# Patient Record
Sex: MALE | Race: WHITE | Employment: FULL TIME | URBAN - METROPOLITAN AREA
[De-identification: names, ages, dates, MRNs, and addresses within clinical notes are randomized per-mention and may not be internally consistent; named-entity substitution may affect disease eponyms.]

---

## 2018-02-06 ENCOUNTER — HOSPITAL ENCOUNTER (EMERGENCY)
Age: 53
Discharge: HOME | End: 2018-02-06
Attending: EMERGENCY MEDICINE
Payer: COMMERCIAL

## 2018-02-06 ENCOUNTER — APPOINTMENT (OUTPATIENT)
Dept: GENERAL RADIOLOGY | Age: 53
End: 2018-02-06
Attending: EMERGENCY MEDICINE
Payer: COMMERCIAL

## 2018-02-06 VITALS
BODY MASS INDEX: 35.94 KG/M2 | TEMPERATURE: 98.4 F | SYSTOLIC BLOOD PRESSURE: 123 MMHG | HEART RATE: 70 BPM | DIASTOLIC BLOOD PRESSURE: 72 MMHG | WEIGHT: 280 LBS | HEIGHT: 74 IN | RESPIRATION RATE: 16 BRPM | OXYGEN SATURATION: 100 %

## 2018-02-06 DIAGNOSIS — M54.9 ACUTE BACK PAIN, UNSPECIFIED BACK LOCATION, UNSPECIFIED BACK PAIN LATERALITY: ICD-10-CM

## 2018-02-06 DIAGNOSIS — M25.559 ARTHRALGIA OF HIP, UNSPECIFIED LATERALITY: ICD-10-CM

## 2018-02-06 DIAGNOSIS — I10 HYPERTENSION, UNSPECIFIED TYPE: Primary | ICD-10-CM

## 2018-02-06 PROCEDURE — 73502 X-RAY EXAM HIP UNI 2-3 VIEWS: CPT

## 2018-02-06 PROCEDURE — 74011250637 HC RX REV CODE- 250/637: Performed by: EMERGENCY MEDICINE

## 2018-02-06 PROCEDURE — 99283 EMERGENCY DEPT VISIT LOW MDM: CPT

## 2018-02-06 PROCEDURE — 72110 X-RAY EXAM L-2 SPINE 4/>VWS: CPT

## 2018-02-06 RX ORDER — AMLODIPINE BESYLATE 5 MG/1
5 TABLET ORAL DAILY
COMMUNITY

## 2018-02-06 RX ORDER — HYDROCODONE BITARTRATE AND ACETAMINOPHEN 5; 325 MG/1; MG/1
1 TABLET ORAL
Status: COMPLETED | OUTPATIENT
Start: 2018-02-06 | End: 2018-02-06

## 2018-02-06 RX ORDER — DOCUSATE SODIUM 100 MG/1
100 CAPSULE, LIQUID FILLED ORAL 2 TIMES DAILY
Qty: 60 CAP | Refills: 2 | Status: SHIPPED | OUTPATIENT
Start: 2018-02-06 | End: 2018-05-07

## 2018-02-06 RX ORDER — DIAZEPAM 5 MG/1
5 TABLET ORAL
Status: COMPLETED | OUTPATIENT
Start: 2018-02-06 | End: 2018-02-06

## 2018-02-06 RX ORDER — DIAZEPAM 5 MG/1
2.5 TABLET ORAL
Qty: 12 TAB | Refills: 0 | Status: SHIPPED | OUTPATIENT
Start: 2018-02-06

## 2018-02-06 RX ORDER — HYDROCODONE BITARTRATE AND ACETAMINOPHEN 5; 325 MG/1; MG/1
1 TABLET ORAL
Qty: 20 TAB | Refills: 0 | Status: SHIPPED | OUTPATIENT
Start: 2018-02-06

## 2018-02-06 RX ORDER — PREDNISONE 20 MG/1
60 TABLET ORAL DAILY
Qty: 15 TAB | Refills: 0 | Status: SHIPPED | OUTPATIENT
Start: 2018-02-06 | End: 2018-02-11

## 2018-02-06 RX ADMIN — HYDROCODONE BITARTRATE AND ACETAMINOPHEN 1 TABLET: 5; 325 TABLET ORAL at 10:18

## 2018-02-06 RX ADMIN — DIAZEPAM 5 MG: 5 TABLET ORAL at 10:18

## 2018-02-06 NOTE — DISCHARGE INSTRUCTIONS
Please see your doctor or clinic in 24-48 hours. Please return for increased pain, loss of bowel or bladder control or any other concerns.      If your symptoms continue, please talk to your doctor about further testing such as a MRI    Your blood pressure was high today, please be sure to take your medications and have your doctor recheck it

## 2018-02-06 NOTE — ED TRIAGE NOTES
Foot got caught while exiting car on Sunday. Fell to ground, landing on hip. C/o right low back/hip pain that \"feels like it is out of socket\".

## 2018-02-06 NOTE — ED PROVIDER NOTES
HPI Comments: Pt with fall about 3 days ago- states that he was getting out of the drivers side of the car and his rt foot was stuck in the door- this caused him to straddle/hyperextend his rt leg    Since then he has had pain in the rt lateral leg- the sensation that his hip joint is \"out of place\"    He states that he is able to walk, but at the end of the day his pain is severe and excruciating    He has been taking ibuprofen and occasional flexeril with minimal improvement    He has pain in the rt groin, worse with movement and lifting his leg is painful, he has to use his hand to initiate the movement    Patient is a 52 y.o. male presenting with hip pain. The history is provided by the patient and the spouse. Hip Injury    This is a recurrent problem. The current episode started more than 2 days ago. The problem has not changed since onset. The pain is present in the right hip. The quality of the pain is described as aching. The pain is moderate. Associated symptoms include limited range of motion. Pertinent negatives include no numbness, no tingling, no back pain and no neck pain. The symptoms are aggravated by standing. Past Medical History:   Diagnosis Date    Lymphoblastic leukemia, chronic (HCC)     Melanoma (HCC)     Tenosynovitis of thumb        Past Surgical History:   Procedure Laterality Date    HX LUMBAR LAMINECTOMY      x 2         Family History:   Problem Relation Age of Onset    Diabetes Maternal Grandmother     Hypertension Brother    Parth Coleamn Arthritis-osteo Mother        Social History     Social History    Marital status:      Spouse name: N/A    Number of children: N/A    Years of education: N/A     Occupational History    Not on file.      Social History Main Topics    Smoking status: Never Smoker    Smokeless tobacco: Not on file    Alcohol use Yes      Comment: once a week    Drug use: Not on file    Sexual activity: Not on file     Other Topics Concern    Not on file     Social History Narrative         ALLERGIES: Review of patient's allergies indicates no known allergies. Review of Systems   Constitutional:        Pt denies other acute medical complaints or concerns. Gastrointestinal: Negative for constipation. Genitourinary: Negative for dysuria and frequency. Musculoskeletal: Negative for back pain and neck pain. Neurological: Negative for tingling and numbness. Vitals:    02/06/18 0945   BP: (!) 155/102   Pulse: 93   Resp: 19   Temp: 98.4 °F (36.9 °C)   SpO2: 98%   Weight: 127 kg (280 lb)   Height: 6' 2\" (1.88 m)            Physical Exam   Constitutional: He is oriented to person, place, and time. He appears well-developed and well-nourished. HENT:   Head: Normocephalic and atraumatic. Eyes: Conjunctivae are normal.   Cardiovascular: Normal rate and regular rhythm. Pulmonary/Chest: Effort normal and breath sounds normal.   Musculoskeletal: He exhibits tenderness. He exhibits no edema or deformity. Right hip: He exhibits tenderness. He exhibits normal range of motion, normal strength, no bony tenderness and no swelling. Legs:  Neurological: He is alert and oriented to person, place, and time. Skin: Skin is warm. Psychiatric: He has a normal mood and affect. Vitals reviewed. MDM  Number of Diagnoses or Management Options  Diagnosis management comments: No mid mine TTP, no loss of bowel or bladder control    Reviewed with pt need for follow up, sx control and follow up with his spine specialist        ED Course   Comment By Time   Pt ambulatory in the ED, no clear weakness    Reviewed with pt and family that this may be a back process, that he will need follow up and further imaging, such as an MRI.     At this time with no loss of bowel or bladder function and good motor function Mayra Greer MD 02/06 1026   Pt spouse is very upset, feels as though pt needs emergent MRI, attempted to calm and explain to her that given pt's physical exam fist steps would be sx control and close follow up, she is calling her husbands doctor to discuss Fawn Negrete MD 02/06 1028   Pt feeling much better, comfortable laying in bed- awaiting xray results Fawn Negrete MD 02/06 1119   Lumbar spine - 5 view(s)     History: Lower back pain radiating down right hip and leg after fall on Sunday     Comparison: None     Findings:     Mineralization is normal. 5 nonrib-bearing lumbar appearing vertebral bodies. The vertebra are normal in height. The disc spaces are preserved other than for  L5-S1 where it is moderately narrowed with endplate sclerosis and marginal  osteophytes. There is 4 mm retrolisthesis L3 on L4 and 3 mm retrolisthesis L4 on  L5. The lumbar lordosis is otherwise preserved. The pedicles and pars are  intact. Facets are aligned and hypertrophic L3-S1.      IMPRESSION  Impression:     No acute fracture. Grade 1 retrolisthesis L3 on L4 and L4 on L5 associated with moderate facet  hypertrophy. Moderate DDD L5-S1 Fawn Negrete MD 02/06 9759   Right hip - 2 view(s)     History: Lower back and right hip and leg pain after a fall a few days ago.     Comparison: None.     Findings:      AP pelvis and frog view of the right hip obtained. Mineralization is normal. The  bony pelvic ring is intact. The right hip joint space is unremarkable.  There is  no evidence of fracture or dislocation.       IMPRESSION  Impression:     Negative hip.     Thank you for this referral. Fawn Negrete MD 02/06 9593       Procedures

## 2018-02-06 NOTE — ED NOTES
Veronica Duarte is a 52 y.o. male that was discharged in stable. Pt was accompanied by spouse. Pt is not driving. The patients diagnosis, condition and treatment were explained to  patient and aftercare instructions were given. The patient verbalized understanding. Patient armband removed and shredded.

## 2018-02-07 ENCOUNTER — OFFICE VISIT (OUTPATIENT)
Dept: ORTHOPEDIC SURGERY | Age: 53
End: 2018-02-07

## 2018-02-07 VITALS
HEIGHT: 74 IN | OXYGEN SATURATION: 98 % | DIASTOLIC BLOOD PRESSURE: 105 MMHG | HEART RATE: 88 BPM | SYSTOLIC BLOOD PRESSURE: 150 MMHG | WEIGHT: 285 LBS | RESPIRATION RATE: 18 BRPM | BODY MASS INDEX: 36.57 KG/M2

## 2018-02-07 DIAGNOSIS — M54.16 LUMBAR RADICULOPATHY: Primary | ICD-10-CM

## 2018-02-07 NOTE — PROGRESS NOTES
[address], [address]  [place]  Phone: [phone]  Fax: [phone]        Naun Carcamo  : 1965  PCP: Jamison Keller MD  2018    NEW PATIENT      ASSESSMENT AND PLAN     Mercedes Lott comes in to the office today c/o acute low back pain that radiates into his right leg in an L5 distribution. Pt's acute pain and physical exam are strongly suggestive of an L5 radiculopathy with neurological deficits. There may be an element of chemical radiculopathy given the acuity/severity and pollard response to steroid treatment. I will refer to PT for lumbar radiculopathy. Given his prior surgeries and new onset symptoms, will try to obtain MRI to evaluate his sensory deficit and weakness in R leg. Advised to continue with steroid pack prescribed by the ER. Pt will f/u in 2 weeks or sooner if needed. Diagnoses and all orders for this visit:    1. Lumbar radiculopathy       Follow-up Disposition: Not on File    [facility]IE[facility] [address] [facility]. is seen today in consultation at the request of Jamison Keller MD for complaints of low back pain. HISTORY OF PRESENT ILLNESS  Mercedes Lott is a 52 y.o. male c/o lower back pain that started . He went to [facility] ER due to the pain where he was treated with a steroid pack that has provided relief. He feels as if \"his leg is not sitting in his hip joint correctly. \" He is having \"spasms\" in his left leg. He has a history of laminectomies on L4-5. Wife states she has been helping him with daily activities such as putting on shoes. He states the pain is similar to the pain he had prior to his laminectomies. Pt denies any fevers, chills, nausea, vomiting. Pt denies any chest pain and shortness of breath. Pt denies any ear, nose, and throat problems. Pt denies any fecal or urinary incontinence.        PAST MEDICAL HISTORY   Past Medical History: Diagnosis Date    Lymphoblastic leukemia, chronic (HCC)     Melanoma (HCC)     Tenosynovitis of thumb        Past Surgical History:   Procedure Laterality Date    HX LUMBAR LAMINECTOMY      x 2       MEDICATIONS    Current Outpatient Prescriptions   Medication Sig Dispense Refill    amLODIPine (NORVASC) 5 mg tablet Take 5 mg by mouth daily.  HYDROcodone-acetaminophen (NORCO) 5-325 mg per tablet Take 1 Tab by mouth every four (4) hours as needed for Pain. Max Daily Amount: 6 Tabs. 20 Tab 0    docusate sodium (COLACE) 100 mg capsule Take 1 Cap by mouth two (2) times a day for 90 days. 60 Cap 2    diazePAM (VALIUM) 5 mg tablet Take 0.5 Tabs by mouth every eight (8) hours as needed (spasm) for up to 12 doses. Max Daily Amount: 7.5 mg. Indications: Muscle Spasm 12 Tab 0    predniSONE (DELTASONE) 20 mg tablet Take 3 Tabs by mouth daily for 5 days. With Breakfast 15 Tab 0       ALLERGIES  No Known Allergies       SOCIAL HISTORY    Social History     Social History    Marital status:      Spouse name: N/A    Number of children: N/A    Years of education: N/A     Social History Main Topics    Smoking status: Never Smoker    Smokeless tobacco: Not on file    Alcohol use Yes      Comment: once a week    Drug use: Not on file    Sexual activity: Not on file     Other Topics Concern    Not on file     Social History Narrative       FAMILY HISTORY  Family History   Problem Relation Age of Onset    Diabetes Maternal Grandmother     Hypertension Brother     Arthritis-osteo Mother          REVIEW OF SYSTEMS  Review of Systems   Musculoskeletal: Positive for back pain. PHYSICAL EXAMINATION  Visit Vitals    BP (!) 150/105    Pulse 88    Resp 18    Ht 6' 2\" (1.88 m)    Wt 285 lb (129.3 kg)    SpO2 98%    BMI 36.59 kg/m2         Pain Assessment  1/20/2015   Location of Pain Ankle; Foot   Location Modifiers Left   Severity of Pain 8   Quality of Pain Aching; Throbbing   Frequency of Pain Constant   Aggravating Factors Standing;Walking   Limiting Behavior Yes   Relieving Factors Rest   Result of Injury No         Constitutional:  Well developed, well nourished, in no acute distress. Psychiatric: Affect and mood are appropriate. HEENT: Normocephalic, atraumatic. Extraocular movements intact. Integumentary: No rashes or abrasions noted on exposed areas. Cardiovascular: Regular rate and rhythm. Pulmonary: Clear to auscultation bilaterally. SPINE/MUSCULOSKELETAL EXAM    Cervical spine:  Neck is midline. Normal muscle tone. No focal atrophy is noted. ROM pain free. Shoulder ROM intact. No tenderness to palpation. Negative Spurling's sign. Negative Tinel's sign. Negative Adamson's sign. Sensation in the bilateral arms grossly intact to light touch. Lumbar spine:  No rash, ecchymosis, or gross obliquity. No fasciculations. No focal atrophy is noted. No pain with hip ROM. Full range of motion. Tenderness to palpation on right lumbar paraspinals. No tenderness to palpation at the sciatic notch. SI joints non-tender. Trochanters non tender. Decreased sensation in R L5. MOTOR:      Biceps  Triceps Deltoids Wrist Ext Wrist Flex Hand Intrin   Right 5/5 5/5 5/5 5/5 5/5 5/5   Left 5/5 5/5 5/5 5/5 5/5 5/5             Hip Flex  Quads Hamstrings Ankle DF EHL Ankle PF   Right 5/5 5/5 5/5 4/5 4/5 5/5   Left 5/5 5/5 5/5 5/5 5/5 5/5     DTRs are +2 biceps, triceps, brachioradialis, patella, and Left Achilles. Decreased reflexes on right Achilles. Positive Right Straight Leg raise. Squat not tested. No difficulty with tandem gait. Ambulation without assistive device. FWB. RADIOGRAPHS  4 View Lumbar spine images taken on 2/6/18 personally reviewed with patient:  No acute fracture. Grade 1 retrolisthesis L3 on L4 and L4 on L5 associated with moderate facet  hypertrophy.   Moderate DDD L5-S1.    2 View Lumbar spine with extension and flexion images taken today in office personally reviewed with patient:  Disk space narrowing between L5-S1. Mild facet sclerosis. No compression fracture. Osteophyte at L5. No listhesis.  reviewed    Mr. Irma Enamorado has a reminder for a \"due or due soon\" health maintenance. I have asked that he contact his primary care provider for follow-up on this health maintenance. This plan was reviewed with the patient and patient agrees. All questions were answered. More than half of this visit today was spent on counseling. Written by Syed Parsons, as dictated by Dr. Donald Martin. I, Dr. Donald Martin, confirm that all documentation is accurate.

## 2018-02-07 NOTE — MR AVS SNAPSHOT
[address] [place] [address] [address] [facility] [address] [address] 
[phone] Patient: Hayley Bermudez MRN: [id number] :1965 Visit Information Date & Time Provider Department Dept. Phone Encounter #  
 2018 10:15 AM Suad Medrano MD [facility] [phone] [id number] Follow-up Instructions Return in about 2 weeks (around 2018). Upcoming Health Maintenance Date Due Hepatitis C Screening 1965 DTaP/Tdap/Td series (1 - Tdap) 1984 FOBT Q 1 YEAR AGE 50-75 2013 Influenza Age 5 to Adult 2017 Allergies as of 2018  Review Complete On: 2018 By: Dali Casillas No Known Allergies Current Immunizations  Never Reviewed No immunizations on file. Not reviewed this visit You Were Diagnosed With   
  
 Codes Comments Lumbar radiculopathy    -  Primary ICD-10-CM: M54.16 
ICD-9-CM: 724.4 Vitals BP Pulse Resp Height(growth percentile) Weight(growth percentile) SpO2  
 (!) 150/105 88 18 6' 2\" (1.88 m) 285 lb (129.3 kg) 98% BMI Smoking Status 36.59 kg/m2 Never Smoker BMI and BSA Data Body Mass Index Body Surface Area  
 36.59 kg/m 2 2.6 m 2 Preferred Pharmacy Pharmacy Name Phone [facility] [address] [place] [address] [phone] Your Updated Medication List  
  
   
This list is accurate as of: 18 11:50 AM.  Always use your most recent med list.  
  
  
  
  
 diazePAM 5 mg tablet Commonly known as:  VALIUM Take 0.5 Tabs by mouth every eight (8) hours as needed (spasm) for up to 12 doses. Max Daily Amount: 7.5 mg. Indications: Muscle Spasm  
  
 docusate sodium 100 mg capsule Commonly known as:  Montey Roch Take 1 Cap by mouth two (2) times a day for 90 days. HYDROcodone-acetaminophen 5-325 mg per tablet Commonly known as:  Julia Simpson  
 Take 1 Tab by mouth every four (4) hours as needed for Pain. Max Daily Amount: 6 Tabs. NORVASC 5 mg tablet Generic drug:  amLODIPine Take 5 mg by mouth daily. predniSONE 20 mg tablet Commonly known as:  Verlytito Nick Take 3 Tabs by mouth daily for 5 days. With Breakfast  
  
  
  
  
We Performed the Following AMB POC XRAY, SPINE, LUMBOSACRAL; 2 O [91424 CPT(R)] REFERRAL TO PHYSICAL THERAPY [ODS71 Custom] Comments:  
 eval and treat for Lumbar radiculopathy Include Froy method Peg Lever at [facility] Follow-up Instructions Return in about 2 weeks (around 2/21/2018). To-Do List   
 02/14/2018 Imaging:  MRI LUMB SPINE WO CONT   
  
 02/15/2018 7:15 AM  
  Appointment with [facility] RM 2 at [address] [place] [address] ([phone]) GENERAL INSTRUCTIONS  Bring information (ID card) if you have any medically implanted devices. You will be required to lie still while the procedure is being performed. Remove any jewelry (including body piercing, hairpins) prior to MRI. If you have had a creatinine level drawn within the past 30 days, please bring most recent results to your appt. Bring any films, CD's, and reports related to your study with you on the day of your exam.  This only includes studies done outside of [facility], [facility], [facility], and [facility]. Bring a complete list of all medications you are currently taking to include prescriptions, over-the-counter meds, herbals, vitamins & any dietary supplements. If you were given medications for claustrophobia or anxiety, please arrange to have someone drive you to your appointment. QUESTIONS  Notify the MRI Department if you have any questions concerning your study. Jelena Castro [address] [phone] [address] - [phone] Referral Information Referral ID Referred By Referred To  
  
 [id number] [facility] [address] [place], [place] [address] Phone: [phone] Fax: [phone] Visits Status Start Date End Date 1 New Request 2/7/18 2/7/19 If your referral has a status of pending review or denied, additional information will be sent to support the outcome of this decision. Referral ID Referred By Referred To  
 [id number] DESI SPENCE Not Available Visits Status Start Date End Date 1 New Request 2/7/18 2/7/19 If your referral has a status of pending review or denied, additional information will be sent to support the outcome of this decision. Introducing \[facility]\"" & HEALTH SERVICES! Dear Anthony Dyer: Thank you for requesting a [employer] account. Our records indicate that you already have an active [employer] account. You can access your account anytime at [email or web address] Where Was it [email or web address] Did you know that you can access your hospital and ER discharge instructions at any time in [employer]? You can also review all of your test results from your hospital stay or ER visit. Additional Information If you have questions, please visit the Frequently Asked Questions section of the [employer] website at [email or web address] Remember, [employer] is NOT to be used for urgent needs. For medical emergencies, dial 911. Now available from your iPhone and Android! Please provide this summary of care documentation to your next provider. Your primary care clinician is listed as [facility]. If you have any questions after today's visit, please call [phone].

## 2018-02-15 ENCOUNTER — HOSPITAL ENCOUNTER (OUTPATIENT)
Age: 53
Discharge: HOME | End: 2018-02-15
Attending: PHYSICAL MEDICINE & REHABILITATION
Payer: COMMERCIAL

## 2018-02-15 DIAGNOSIS — M54.16 LUMBAR RADICULOPATHY: ICD-10-CM

## 2018-02-15 PROCEDURE — 72148 MRI LUMBAR SPINE W/O DYE: CPT

## 2018-03-07 ENCOUNTER — OFFICE VISIT (OUTPATIENT)
Dept: ORTHOPEDIC SURGERY | Age: 53
End: 2018-03-07

## 2018-03-07 VITALS
SYSTOLIC BLOOD PRESSURE: 139 MMHG | DIASTOLIC BLOOD PRESSURE: 88 MMHG | WEIGHT: 287 LBS | TEMPERATURE: 97.6 F | RESPIRATION RATE: 17 BRPM | HEIGHT: 74 IN | HEART RATE: 72 BPM | BODY MASS INDEX: 36.83 KG/M2 | OXYGEN SATURATION: 96 %

## 2018-03-07 DIAGNOSIS — M54.16 LUMBAR RADICULOPATHY: Primary | ICD-10-CM

## 2018-03-07 NOTE — PROGRESS NOTES
[facility]  [address], [address]  [place], [address] [place] [address] Street  Phone: [phone]  Fax: [phone]        Giancarlo Zamora  : 1965  PCP: Citlaly Valero MD  3/7/2018    PROGRESS NOTE      ASSESSMENT AND PLAN    Damon Meckel. comes in to the office today for MRI f/u. Overall, he has seen an improvement in his pain and rates it as a 1/10 today. However, he continues to have some low back pain that radiates into his right leg in an L5 distribution. His MRI reveals L5-S1 degenerative changes resulting in moderate bilateral foraminal stenoses at L5. These findings seemingly correlate with his RLE radicular symptoms. There is also a HIZ at L4-5 which may be responsible for a local neuritis. At this time, he will monitor his symptoms and begin PT tomorrow. His MRI reveals some renal cysts. He will follow up with his PCP. I advised he maintain an HEP. Pt will f/u in 3 months or sooner as needed. Diagnoses and all orders for this visit:    1. Lumbar radiculopathy       Follow-up Disposition: Not on File      HISTORY OF PRESENT ILLNESS  Damon Meckel. is a 52 y.o. male. A&P / HPI from 18:  Damon Meckel. comes in to the office today c/o acute low back pain that radiates into his right leg in an L5 distribution.     Pt's acute pain and physical exam are strongly suggestive of an L5 radiculopathy with neurological deficits. There may be an element of chemical radiculopathy given the acuity/severity and pollard response to steroid treatment.      I will refer to PT for lumbar radiculopathy. Given his prior surgeries and new onset symptoms, will try to obtain MRI to evaluate his sensory deficit and weakness in R leg.      Advised to continue with steroid pack prescribed by the ER.     Pt will f/u in 2 weeks or sooner if needed. HISTORY OF PRESENT ILLNESS  Damon Meckel. is a 52 y.o. male c/o lower back pain that started .  He went to [place] [address] ER due to the pain where he was treated with a steroid pack that has provided relief. He feels as if \"his leg is not sitting in his hip joint correctly. \" He is having \"spasms\" in his left leg. He has a history of laminectomies on L4-5. Wife states she has been helping him with daily activities such as putting on shoes. He states the pain is similar to the pain he had prior to his laminectomies.      Pt denies any fevers, chills, nausea, vomiting. Pt denies any chest pain and shortness of breath. Pt denies any ear, nose, and throat problems. Pt denies any fecal or urinary incontinence. Updates from 03/07/18:  Pt presents for MRI f/u. Overall, he has seen an improvement in his pain and rates it as a 1/10 today. However, he continues to have some low back pain that radiates into his right leg in an L5 distribution. He begins PT tomorrow. He is accompanied by his wife. PAST MEDICAL HISTORY   Past Medical History:   Diagnosis Date    [facility] [address]     Melanoma ([facility] [address]     Tenosynovitis of thumb        Past Surgical History:   Procedure Laterality Date    HX LUMBAR LAMINECTOMY      x 2   . MEDICATIONS    Current Outpatient Prescriptions   Medication Sig Dispense Refill    amLODIPine (NORVASC) 5 mg tablet Take 5 mg by mouth daily.  HYDROcodone-acetaminophen (NORCO) 5-325 mg per tablet Take 1 Tab by mouth every four (4) hours as needed for Pain. Max Daily Amount: 6 Tabs. 20 Tab 0    docusate sodium (COLACE) 100 mg capsule Take 1 Cap by mouth two (2) times a day for 90 days. 60 Cap 2    diazePAM (VALIUM) 5 mg tablet Take 0.5 Tabs by mouth every eight (8) hours as needed (spasm) for up to 12 doses. Max Daily Amount: 7.5 mg.  Indications: Muscle Spasm 12 Tab 0        ALLERGIES  No Known Allergies       SOCIAL HISTORY    Social History     Social History    Marital status:      Spouse name: N/A    Number of children: N/A    Years of education: N/A     Social History Main Topics    Smoking status: Never Smoker    Smokeless tobacco: Not on file    Alcohol use Yes      Comment: once a week    Drug use: Not on file    Sexual activity: Not on file     Other Topics Concern    Not on file     Social History Narrative       FAMILY HISTORY  Family History   Problem Relation Age of Onset    Diabetes Maternal Grandmother     Hypertension Brother     Arthritis-osteo Mother          REVIEW OF SYSTEMS  Review of Systems   Musculoskeletal: Positive for back pain. PHYSICAL EXAMINATION  There were no vitals taken for this visit. Pain Assessment  1/20/2015   Location of Pain Ankle; Foot   Location Modifiers Left   Severity of Pain 8   Quality of Pain Aching; Throbbing   Frequency of Pain Constant   Aggravating Factors Standing;Walking   Limiting Behavior Yes   Relieving Factors Rest   Result of Injury No           Constitutional:  Well developed, well nourished, in no acute distress. Psychiatric: Affect and mood are appropriate. Integumentary: No rashes or abrasions noted on exposed areas. SPINE/MUSCULOSKELETAL EXAM    Cervical spine:  Neck is midline. Normal muscle tone. No focal atrophy is noted. ROM pain free. Shoulder ROM intact. No tenderness to palpation. Negative Spurling's sign. Negative Tinel's sign. Negative Adamson's sign.       Sensation in the bilateral arms grossly intact to light touch.      Lumbar spine:  No rash, ecchymosis, or gross obliquity. No fasciculations. No focal atrophy is noted. No pain with hip ROM. Full range of motion. Tenderness to palpation on right lumbar paraspinals. No tenderness to palpation at the sciatic notch. SI joints non-tender. Trochanters non tender.      Decreased sensation in R L5.     MOTOR:      Biceps  Triceps Deltoids Wrist Ext Wrist Flex Hand Intrin   Right 5/5 5/5 5/5 5/5 5/5 5/5   Left 5/5 5/5 5/5 5/5 5/5 5/5             Hip Flex  Quads Hamstrings Ankle DF EHL Ankle PF   Right 5/5 5/5 5/5 5/5 5/5 5/5   Left 5/5 5/5 5/5 5/5 5/5 5/5     RADIOGRAPHS  Lumbar MRI images taken on 2/15/18 personally reviewed with patient:  FINDINGS:     General: Vertebral body heights preserved. L5-S1 mild disc space loss with  chronic-appearing endplate changes. L4-L5 disc desiccation. No significant  listhesis. Lumbar lordosis maintained. Conus ends at level L1. No abnormal  signal in the distal cord. No abnormal epidural collection identified. No  suspicious marrow signal.     Miscellaneous: Probable right renal cyst and probable subcentimeter left renal  cyst.     Levels:     T11-T12, T12-L1: Evaluated sagittal plane only. No significant degenerative  changes or stenosis.     L1-L2: No significant degenerative changes or stenosis.     L2-L3: No significant degenerative changes or stenosis.     L3-L4: Minimal disc bulge with small left foraminal disc protrusion. Facets  unremarkable. Spinal canal patent. Minimal foraminal narrowing.     L4-L5: Mild disc bulge. Mild facet arthropathy. Minimal spinal canal narrowing. Mild bilateral foraminal stenoses.     L5-S1: Mild disc space loss with mild to moderate disc bulge and right foraminal  to lateral disc/osteophyte protrusion. Mild facet arthropathy. Spinal canal  patent. Moderate bilateral foraminal stenoses with probable abutment of the  exiting L5 roots.     Imaged sacrum: Unremarkable.     IMPRESSION  IMPRESSION:     1. L5-S1 degenerative changes characterized primarily by disc disease results in  moderate foraminal stenoses and possible abutment of the exiting L5 roots.     -L4-L5 degenerative changes results in minimal spinal canal narrowing and mild  foraminal stenoses.     -Lesser degree degenerative changes and stenoses elsewhere. See level by level  details above. 4 View Lumbar spine images taken on 2/6/18 personally reviewed with patient:  No acute fracture.   Grade 1 retrolisthesis L3 on L4 and L4 on L5 associated with moderate facet  hypertrophy. Moderate DDD L5-S1.     2 View Lumbar spine with extension and flexion images taken today in office personally reviewed with patient:  Disk space narrowing between L5-S1. Mild facet sclerosis. No compression fracture. Osteophyte at L5. No listhesis. 25 minutes of face-to-face contact were spent with the patient during today's visit extensively discussing symptoms and treatment plan. All questions were answered. More than half of this visit today was spent on counseling.      Written by Radha Smith as dictated by Compa Ogden MD

## 2018-03-07 NOTE — MR AVS SNAPSHOT
[address] [place] [address] [address] [facility] [address] 
[phone] Patient: Matthew Balbuena. MRN: [id number] :1965 Visit Information Date & Time Provider Department Dept. Phone Encounter #  
 3/7/2018  3:30 PM Anjum Mcdonald MD [facility] [phone] [id number] Follow-up Instructions Return in about 3 months (around 2018). Upcoming Health Maintenance Date Due Hepatitis C Screening 1965 DTaP/Tdap/Td series (1 - Tdap) 1984 FOBT Q 1 YEAR AGE 50-75 2013 Influenza Age 5 to Adult 2017 Allergies as of 3/7/2018  Review Complete On: 2018 By: Anjum Mcdonald MD  
 No Known Allergies Current Immunizations  Never Reviewed No immunizations on file. Not reviewed this visit You Were Diagnosed With   
  
 Codes Comments Lumbar radiculopathy    -  Primary ICD-10-CM: M54.16 
ICD-9-CM: 724.4 Vitals BP Pulse Temp Resp Height(growth percentile) Weight(growth percentile) 139/88 72 97.6 °F (36.4 °C) (Oral) 17 6' 2\" (1.88 m) 287 lb (130.2 kg) SpO2 BMI Smoking Status 96% 36.85 kg/m2 Never Smoker BMI and BSA Data Body Mass Index Body Surface Area  
 36.85 kg/m 2 2.61 m 2 Your Updated Medication List  
  
   
This list is accurate as of 3/7/18  4:23 PM.  Always use your most recent med list.  
  
  
  
  
 diazePAM 5 mg tablet Commonly known as:  VALIUM Take 0.5 Tabs by mouth every eight (8) hours as needed (spasm) for up to 12 doses. Max Daily Amount: 7.5 mg. Indications: Muscle Spasm  
  
 docusate sodium 100 mg capsule Commonly known as:  Alfonse Kras Take 1 Cap by mouth two (2) times a day for 90 days. HYDROcodone-acetaminophen 5-325 mg per tablet Commonly known as:  Mertie [place] Take 1 Tab by mouth every four (4) hours as needed for Pain. Max Daily Amount: 6 Tabs. NORVASC 5 mg tablet Generic drug:  amLODIPine Take 5 mg by mouth daily. Follow-up Instructions Return in about 3 months (around 6/7/2018). Introducing [facility]! Dear Keena Torrez: Thank you for requesting a [employer] account. Our records indicate that you already have an active [employer] account. You can access your account anytime at [email or web address] Did you know that you can access your hospital and ER discharge instructions at any time in [employer] You can also review all of your test results from your hospital stay or ER visit. Additional Information If you have questions, please visit the Frequently Asked Questions section of the [employer] website at [email or web address] Remember, [employer] is NOT to be used for urgent needs. For medical emergencies, dial 911. Now available from your iPhone and Android! Please provide this summary of care documentation to your next provider. Your primary care clinician is listed as [facility]. If you have any questions after today's visit, please call [phone].

## 2019-01-08 ENCOUNTER — OFFICE VISIT (OUTPATIENT)
Dept: ORTHOPEDIC SURGERY | Age: 54
End: 2019-01-08

## 2019-01-08 VITALS
RESPIRATION RATE: 16 BRPM | OXYGEN SATURATION: 96 % | DIASTOLIC BLOOD PRESSURE: 75 MMHG | TEMPERATURE: 97.9 F | SYSTOLIC BLOOD PRESSURE: 122 MMHG | BODY MASS INDEX: 36.7 KG/M2 | HEIGHT: 74 IN | WEIGHT: 286 LBS | HEART RATE: 68 BPM

## 2019-01-08 DIAGNOSIS — M76.72 TENDINITIS OF LEFT PERONEUS BREVIS TENDON: ICD-10-CM

## 2019-01-08 DIAGNOSIS — M79.672 LEFT FOOT PAIN: Primary | ICD-10-CM

## 2019-01-08 PROBLEM — E66.01 SEVERE OBESITY (HCC): Status: ACTIVE | Noted: 2019-01-08

## 2019-01-08 RX ORDER — BISMUTH SUBSALICYLATE 262 MG
1 TABLET,CHEWABLE ORAL DAILY
COMMUNITY

## 2019-01-08 RX ORDER — AMLODIPINE AND OLMESARTAN MEDOXOMIL 10; 20 MG/1; MG/1
TABLET ORAL
COMMUNITY

## 2019-01-08 NOTE — PROGRESS NOTES
AMBULATORY PROGRESS NOTE Patient: Compa Lynch MRN: [id number]     SSN: [id number] Body mass index is 36.72 kg/m². YOB: 1965     AGE: 52 y.o. EX: male PCP: Patric Suero MD 
 
 IMPRESSION/DIAGNOSIS AND TREATMENT PLAN  
 
DIAGNOSES 1. Left foot pain 2. Tendinitis of left peroneus brevis tendon Orders Placed This Encounter  [[id number]] Foot Min 3V  MRI ANKLE LT WO CONT Compa Lynch understands his diagnoses and the proposed plan. Plan: 
 
1) MRI without IV Contrast of the left ankle; please assess 5th metatarsal base, CC joint, and peroneal tendons. 2) Continue activity modification as directed. RTO - after MRI 
 HPI AND EXAMINATION Compa Lynch IS A 52 y.o. male who presents to my outpatient office complaining of left foot pain. Mr. Alban Meckel states that he has been experiencing pain in his lateral left hindfoot, near the 5th metatarsal base, for the past few months. He notes that around Christmas, his pain worsened and his wife had him schedule an appointment. He has been taking OTC Ibuprofen for the pain, which helps, but at the end of the day, he reports that his foot is throbbing and aching. He notes that his foot aches now as he is sitting on the exam table, but that it aches more in his shoe. He believes that walking aggravates his pain. He notes that the more he is on his feet, the worse it is, but he also notes that the pain is present in the mornings, as well. Mr. Alban Meckel states that by the time he gets to work in the morning, he is ready to take something for his pain. He adds that at one point, he felt as if there was a rock in his foot, near the 5th metatarsal base. The patient denies any recent falls, twists, or trauma. He denies any changes in his daily activities. XR imaging has been reviewed with the patient.   
 
As it regards to his hands, Mr. Alban Meckel states that he has a h/o trigger finger and has had to have injections in his hands. Visit Vitals /75 Pulse 68 Temp 97.9 °F (36.6 °C) (Oral) Resp 16 Ht 6' 2\" (1.88 m) Wt 286 lb (129.7 kg) SpO2 96% BMI 36.72 kg/m² ANKLE/FOOT left Psychiatry: Alert, Oriented x 3; Speech normal in context and clarity,  
         Memory intact grossly, no involuntary movements - tremors, no dementia Gait: Normal 
Tenderness: Mild tenderness to the 4th and 5th metatarsal bases. Cutaneous:  WNL. Joint Motion: WNL. Joint / Tendon Stability: No Ankle or Subtalar instability or joint laxity. No peroneal sublux ability or dislocation Alignment: Mild Pes Cavus. Neuro Motor/Sensory: NL/NL. Vascular: NL foot/ankle pulses. Lymphatics: No extremity lymphedema, No calf swelling, no tenderness to calf muscles. CHART REVIEW Past Medical History:  
Diagnosis Date  Lymphoblastic leukemia, chronic ([facility] [address]  Melanoma ([facility] [address]  Tenosynovitis of thumb Current Outpatient Medications Medication Sig  
 amLODIPine-Olmesartan (PAWAN) 10-20 mg tab Take  by mouth.  B.infantis-B.ani-B.long-B.bifi (PROBIOTIC 4X) 10-15 mg TbEC Take  by mouth.  multivitamin (ONE A DAY) tablet Take 1 Tab by mouth daily.  ibuprofen 100 mg tablet Take 100 mg by mouth every six (6) hours as needed for Pain.  amLODIPine (NORVASC) 5 mg tablet Take 5 mg by mouth daily.  HYDROcodone-acetaminophen (NORCO) 5-325 mg per tablet Take 1 Tab by mouth every four (4) hours as needed for Pain. Max Daily Amount: 6 Tabs.  diazePAM (VALIUM) 5 mg tablet Take 0.5 Tabs by mouth every eight (8) hours as needed (spasm) for up to 12 doses. Max Daily Amount: 7.5 mg. Indications: Muscle Spasm No current facility-administered medications for this visit. No Known Allergies Past Surgical History:  
Procedure Laterality Date  HX LUMBAR LAMINECTOMY    
 x 2 Social History Occupational History  Not on file Tobacco Use  
  Smoking status: Never Smoker  Smokeless tobacco: Never Used Substance and Sexual Activity  Alcohol use: Yes Comment: once a week  Drug use: Not on file  Sexual activity: Not on file Family History Problem Relation Age of Onset  Diabetes Maternal Grandmother  Hypertension Brother [address] [facility] [address] Arthritis-osteo Mother REVIEW OF SYSTEMS : 1/8/2019  ALL BELOW ARE Negative except : SEE HPI Constitutional: Negative for fever, chills and weight loss. Neg Weight Loss Cardiovascular: Negative for chest pain, claudication and leg swelling. SOB, ROSADO Gastrointestinal/Urological: Negative for  pain, N/V/D/C, Blood in stool or urine,dysuria                         Hematuria, Incontinence, pelvic pain Musculoskeletal: see HPI. Neurological: Negative for dizziness and weakness, headaches,Visual Changes             Confusion,  Or Seizures, Psychiatric/Behavioral: Negative for depression, memory loss and substance abuse. Extremities:  Negative for hair changes, rash or skin lesion changes. Hematologic: Negative for Bleeding problems, bruising, pallor or swollen lymph nodes. Peripheral Vascular: No calf pain, vascular vein tenderness to calf pain No calf throbbing, posterior knee throbbing pain DIAGNOSTIC IMAGING  
 
FOOT X RAYS 3 VIEWS Left  
1/8/2019 NON WEIGHT BEARING 
 
X RAYS AT [address] [place] [address] 
1/8/2019 Bones: No fractures or dislocations. No focal osteolytic or osteoblastic process Bone Spurs: No significant bone spurs Foot Alignment: WNL Joint Condition: No Significant OA Soft Tissues: Normal, No radiopaque foreign body and No abnormal calcific densities to soft tissues No ankle joint effusion in lateral projection. Mineralization: Suggests  no Osteopenia I have personally reviewed the results of the above study. The interpretation of this study is my professional opinion Please see above section of this report. I have personally reviewed the results of the above study. The interpretation of this study is my professional opinion. Written by Fransisca Campbell, as dictated by Dr. Collette Dunbar. I, Dr. Collette Dunbar, confirm that all documentation is accurate.

## 2019-01-08 NOTE — PROGRESS NOTES
1. Have you been to the ER, urgent care clinic since your last visit? Hospitalized since your last visit? No 
 
2. Have you seen or consulted any other health care providers outside of the [address] [place] [address] since your last visit? Include any pap smears or colon screening.  No

## 2019-01-08 NOTE — PATIENT INSTRUCTIONS
Foot Pain: Care Instructions Your Care Instructions Foot injuries that cause pain and swelling are fairly common. Almost all sports or home repair projects can cause a misstep that ends up as foot pain. Normal wear and tear, especially as you get older, also can cause foot pain. Most minor foot injuries will heal on their own, and home treatment is usually all you need to do. If you have a severe injury, you may need tests and treatment. Follow-up care is a key part of your treatment and safety. Be sure to make and go to all appointments, and call your doctor if you are having problems. It's also a good idea to know your test results and keep a list of the medicines you take. How can you care for yourself at home? · Take pain medicines exactly as directed. ? If the doctor gave you a prescription medicine for pain, take it as prescribed. ? If you are not taking a prescription pain medicine, ask your doctor if you can take an over-the-counter medicine. · Rest and protect your foot. Take a break from any activity that may cause pain. · Put ice or a cold pack on your foot for 10 to 20 minutes at a time. Put a thin cloth between the ice and your skin. · Prop up the sore foot on a pillow when you ice it or anytime you sit or lie down during the next 3 days. Try to keep it above the level of your heart. This will help reduce swelling. · Your doctor may recommend that you wrap your foot with an elastic bandage. Keep your foot wrapped for as long as your doctor advises. · If your doctor recommends crutches, use them as directed. · Wear roomy footwear. · As soon as pain and swelling end, begin gentle exercises of your foot. Your doctor can tell you which exercises will help. When should you call for help? Call 911 anytime you think you may need emergency care. For example, call if: 
  · Your foot turns pale, white, blue, or cold.  
 Call your doctor now or seek immediate medical care if:   · You cannot move or stand on your foot.  
  · Your foot looks twisted or out of its normal position.  
  · Your foot is not stable when you step down.  
  · You have signs of infection, such as: 
? Increased pain, swelling, warmth, or redness. ? Red streaks leading from the sore area. ? Pus draining from a place on your foot. ? A fever.  
  · Your foot is numb or tingly.  
 Watch closely for changes in your health, and be sure to contact your doctor if: 
  · You do not get better as expected.  
  · You have bruises from an injury that last longer than 2 weeks. Where can you learn more? Go to http://philomena-richa.info/. Enter I639 in the search box to learn more about \"Foot Pain: Care Instructions. \" Current as of: November 29, 2017 Content Version: 11.8 © 9711-1978 [employer]. Care instructions adapted under license by [employer] (which disclaims liability or warranty for this information). If you have questions about a medical condition or this instruction, always ask your healthcare professional. [address] any warranty or liability for your use of this information.

## 2019-01-16 ENCOUNTER — HOSPITAL ENCOUNTER (OUTPATIENT)
Age: 54
Discharge: HOME | End: 2019-01-16
Attending: ORTHOPAEDIC SURGERY
Payer: COMMERCIAL

## 2019-01-16 DIAGNOSIS — M79.672 LEFT FOOT PAIN: ICD-10-CM

## 2019-01-16 PROCEDURE — 73721 MRI JNT OF LWR EXTRE W/O DYE: CPT

## 2019-01-22 ENCOUNTER — OFFICE VISIT (OUTPATIENT)
Dept: ORTHOPEDIC SURGERY | Age: 54
End: 2019-01-22

## 2019-01-22 DIAGNOSIS — M76.72 TENDINITIS OF LEFT PERONEUS BREVIS TENDON: Primary | ICD-10-CM

## 2019-01-22 RX ORDER — FAMOTIDINE 40 MG/1
40 TABLET, FILM COATED ORAL DAILY
Qty: 30 TAB | Refills: 0 | Status: SHIPPED | OUTPATIENT
Start: 2019-01-22

## 2019-01-22 RX ORDER — METHYLPREDNISOLONE 4 MG/1
4 TABLET ORAL
Qty: 21 TAB | Refills: 0 | Status: SHIPPED | OUTPATIENT
Start: 2019-01-22

## 2019-01-22 NOTE — PROGRESS NOTES
AMBULATORY PROGRESS NOTE      Patient: Manny Pan MRN: [id number]     SSN: [id number] There is no height or weight on file to calculate BMI. YOB: 1965     AGE: 52 y.o. EX: male    PCP: Adryan Landrum MD     IMPRESSION/DIAGNOSIS AND TREATMENT PLAN     DIAGNOSES  1. Tendinitis of left peroneus brevis tendon        Orders Placed This Encounter    AMB SUPPLY ORDER    AMB SUPPLY ORDER    methylPREDNISolone (MEDROL DOSEPACK) 4 mg tablet    famotidine (PEPCID) 40 mg tablet      Manny Pan understands his diagnoses and the proposed plan. So, I reviewed the MRI, and he does have peroneal, interstitial, degenerative tearing to the peroneal brevis tendon distally on this left foot bone marrow edema. I am going to put him in a short CAM walker boot to offload him in a laterally-posted orthotic after wearing the boot for about two weeks or so. I will see him again in about one month. Should his symptoms worsen, of course, we will see him sooner. I do not want him to do any type of cortisone injection for he may have some deleterious effects on the tendon distally. We will put him on a Medrol Dosepak with Pepcid to see if we can get some of the inflammation out of that tendon distally. Plan:    1) DME Order: Short left CAM walker boot. 2) DME Order: Custom left trilaminar orthotic insert with lateral posting (TPC). 3) Use cryotherapy as directed. 4) Continue activity modification as directed. 5) Work Note: Please allow Mr. Espana to use his CAM walker boot at work. He is instructed to limit his walking as much as possible. Please place him on office work or light duty, if available. 6) Medrol DosePak. 7) Pepcid 40 m PO every day; 30 tablets, 0 refills. RTO - 4 weeks     HPI AND EXAMINATION     Manny Pan IS A 52 y.o. male who presents to my outpatient office for follow up of tendinitis of the left peroneus brevis tendon.  At the last visit, I ordered an MRI of the left ankle and instructed the patient to continue activity modification as directed. Since we saw him last, Mr. Benoit January states that he is still experiencing pain along his left peroneus brevis tendon and 4th and 5th metatarsal bases. MRI results have been reviewed with the patient. Of note, the patient reports that he has a history of taking large amounts of NSAIDs and has been instructed by his oncologist to cut back on them. The patient also has h/o back issues. The patient has h/o CLL and h/o taking large amounts of NSAIDs. As such, his oncologist has instructed him to cut back on NSAIDs. 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 There were no vitals taken for this visit. Appearance: Alert, well appearing and pleasant patient who is in no distress, oriented to person, place/time, and who follows commands. This patient is accompanied in the examination room by his spouse. Dementia: no dementia  Psychiatric: Affect and mood are appropriate. Patient arrives to office via: without assistive device  HEENT: Head normocephalic & atraumatic. Both pupils are round, non icteric sclera   Eye: EOM are intact and sclera are clear    Neck: ROM WNL and JVD neck is not present     Hearings Intact, does not require hearing aid device  Respiratory: Breathing is unlabored without accessory chest muscle use  Cardiovascular/Peripheral Vascular: Normal Pulses to each hand and foot    ANKLE/FOOT left     Gait: Normal  Tenderness: Mild tenderness to the 4th and 5th metatarsal bases. Mild tenderness to the peroneus brevis tendon. Cutaneous: WNL. Joint Motion: WNL. Joint / Tendon Stability: No Ankle or Subtalar instability or joint laxity. No peroneal sublux ability or dislocation  Alignment: Mild Pes Cavus. Neuro Motor/Sensory: NL/NL. Vascular: NL foot/ankle pulses.   Lymphatics: No extremity lymphedema, No calf swelling, no tenderness to calf muscles. CHART REVIEW     Past Medical History:   Diagnosis Date    Lymphoblastic leukemia, chronic (HCC)     Melanoma (HCC)     Tenosynovitis of thumb      Current Outpatient Medications   Medication Sig    methylPREDNISolone (MEDROL DOSEPACK) 4 mg tablet Take 1 Tab by mouth Specific Days and Specific Times.  famotidine (PEPCID) 40 mg tablet Take 1 Tab by mouth daily.  amLODIPine-Olmesartan (PAWAN) 10-20 mg tab Take  by mouth.  B.infantis-B.ani-B.long-B.bifi (PROBIOTIC 4X) 10-15 mg TbEC Take  by mouth.  multivitamin (ONE A DAY) tablet Take 1 Tab by mouth daily.  ibuprofen 100 mg tablet Take 100 mg by mouth every six (6) hours as needed for Pain.  amLODIPine (NORVASC) 5 mg tablet Take 5 mg by mouth daily.  HYDROcodone-acetaminophen (NORCO) 5-325 mg per tablet Take 1 Tab by mouth every four (4) hours as needed for Pain. Max Daily Amount: 6 Tabs.  diazePAM (VALIUM) 5 mg tablet Take 0.5 Tabs by mouth every eight (8) hours as needed (spasm) for up to 12 doses. Max Daily Amount: 7.5 mg. Indications: Muscle Spasm     No current facility-administered medications for this visit. No Known Allergies  Past Surgical History:   Procedure Laterality Date    HX LUMBAR LAMINECTOMY      x 2     Social History     Occupational History    Not on file   Tobacco Use    Smoking status: Never Smoker    Smokeless tobacco: Never Used   Substance and Sexual Activity    Alcohol use: Yes     Comment: once a week    Drug use: Not on file    Sexual activity: Not on file     Family History   Problem Relation Age of Onset    Diabetes Maternal Grandmother     Hypertension Brother     Arthritis-osteo Mother         REVIEW OF SYSTEMS : 1/22/2019  ALL BELOW ARE Negative except : SEE HPI     Constitutional: Negative for fever, chills and weight loss. Neg Weight Loss  Cardiovascular: Negative for chest pain, claudication and leg swelling.  SOB, ROSADO   Gastrointestinal/Urological: Negative for pain, N/V/D/C, Blood in stool or urine,dysuria                         Hematuria, Incontinence, pelvic pain  Musculoskeletal: see HPI. Neurological: Negative for dizziness and weakness, headaches,Visual Changes             Confusion,  Or Seizures,   Psychiatric/Behavioral: Negative for depression, memory loss and substance abuse. Extremities:  Negative for hair changes, rash or skin lesion changes. Hematologic: Negative for Bleeding problems, bruising, pallor or swollen lymph nodes. Peripheral Vascular: No calf pain, vascular vein tenderness to calf pain              No calf throbbing, posterior knee throbbing pain     DIAGNOSTIC IMAGING     No notes on file     MRI Results (most recent):  Results from Hospital Encounter encounter on 01/16/19   MRI ANKLE LT WO CONT    Narrative Procedure: MRI of the left ankle without contrast.    Indications: Pain. Please assess fifth metatarsal. Evaluate cc joint and  peroneal tendons    Technique: Multi-sequence, multiplanar T1, T2, STIR  with and without fat  saturation obtained centered . Left ankle]    Comparisons: None    Findings:    Sinus Tarsi unremarkable. Mild chronic thickening of the plantar fascia. Spurring is noted at the  attachment. Achilles tendon intact. Cystic change noted in the calcaneus. Small tibiotalar effusion and trace posterior subtalar effusion noted. Calcaneocuboid articulation intact. It does however demonstrates subchondral  cystic change within the cuboid with large areas of cartilage uncovering. Extensor tendons intact. Posterior tibialis demonstrates minimal tendinopathy at the attachment. Flexor  hallucis longus and flexor digitorum longus tendons intact. Heterogeneous signal  noted along the superior medial bundle of the spring ligament. Deltoid ligament  complex intact.      Anterior talofibular ligament, posterior talofibular ligament, anterior  inferior tibiofibular ligament and posterior inferior tibiofibular ligaments  intact. Calcaneofibular ligament intact. There is heterogeneous signal with  possible split tear of the mid to distal portion of the peroneus brevis tendon. At least partial tearing is noted at the base of the fifth metatarsal. Edema is  present at the osseous insertion. Peroneus longus tendon is intact. Nonspecific subcutaneous edema is noted, but most pronounced along the base of  the fifth metatarsal.     Talar dome unremarkable. There is edema noted within the abductor hallucis muscle belly with tiny  muscular cystic change. Impression Impression:    Tendinopathy and interstitial tearing of the peroneus brevis with insertional  osseous edema. Calcaneocuboid chondrosis as discussed. Chronic plantar fascitis. Abductor hallucis strain. Small tibiotalar effusion. Nonspecific subcutaneous edema most pronounced at base of fifth metatarsal.    Spring ligament sprain. Posterior tibialis tendinopathy. Please see report for additional findings and further details. Thank you for this referral.        Please see above section of this report. I have personally reviewed the results of the above study. The interpretation of this study is my professional opinion. Written by Jennifer Dangelo, as dictated by Dr. Radha Rodriguez. I, Dr. Radha Rodriguez, confirm that all documentation is accurate.

## 2019-01-22 NOTE — LETTER
NOTIFICATION RETURN TO WORK / SCHOOL 
 
1/22/2019 9:51 AM 
 
Mr. Paris Garber 
[address] [place] [id number] To Whom It May Concern: 
 
Paris Garber. is currently under the care of [address] [place] [address]. Please allow Mr. Espana to use his CAM walker boot at work. He is instructed to limit his walking as much as possible. Please place him on office work or light duty, if available. If there are questions or concerns please have the patient contact our office.  
 
 
 
Sincerely, 
 
 
Sophia Benoit MD

## 2019-01-22 NOTE — PROGRESS NOTES
Verbal order given by Dr. Nikki Sevilla to sign order for DMEs entered by [facility].

## 2019-01-22 NOTE — PATIENT INSTRUCTIONS
Please follow up in 4 weeks. You are advised to contact us if your condition worsens. You have been provided with an order for durable medical equipment that you may  at an outside facility as our office does not carry the equipment you need. You may pick it up at any medical supply company you like. Listed below are a few different locations for your convenience:    [address]  Phone: [phone]  [place]:   [address]  [place], [address] [place]:  [address]  [place]:  [facility],[address] [place], [address]      Tendon Injury (Tendinopathy): Care Instructions  Your Care Instructions    Tendons are tough, flexible tissues that connect muscle to bone. A tendon can hurt or get torn from overuse or aging, especially tendons in the shoulder, elbow, wrist, hip, knee, or ankle. Tendon injuries may be called tendinopathy or tendinitis. Tendon injuries can occur from any motion you have to repeat in a job, sports, or daily activities. Tennis elbow is one common tendon injury. You can treat most tendon problems with over-the-counter pain medicine, rest, changes in your activities, and physical therapy. Follow-up care is a key part of your treatment and safety. Be sure to make and go to all appointments, and call your doctor if you are having problems. It's also a good idea to know your test results and keep a list of the medicines you take. How can you care for yourself at home? · Rest the sore area. You may have to stop doing the activity that caused the tendon pain for a while. · Take an over-the-counter pain medicine, such as acetaminophen (Tylenol), ibuprofen (Advil, Motrin), or naproxen (Aleve). Read and follow all instructions on the label. · Do not take two or more pain medicines at the same time unless the doctor told you to. Many pain medicines have acetaminophen, which is Tylenol.  Too much acetaminophen (Tylenol) can be harmful. · Put ice or a cold pack on the sore area for 10 to 20 minutes at a time. Try to do this every 1 to 2 hours for the next 3 days (when you are awake) or until any swelling goes down. Put a thin cloth between the ice and your skin. · Prop up the sore area on a pillow when you ice it or anytime you sit or lie down during the next 3 days. Try to keep it above the level of your heart. This will help reduce swelling. · Follow your doctor's advice for wearing and caring for a sling, splint, or cast. In some cases, you may wear one of these for a while to help your tendon heal.  · Follow your doctor's advice for stretching and physical therapy. Gently move your joint through its full range of motion. This will prevent stiffness in your joint. · Go back to your activity slowly. Warm up before and stretch after the activity. You also can try making some changes. For example, if a sport caused your tendon pain, alternate the sport with another activity. If using a tool causes pain, switch hands or change your . Stop the activity if it hurts. After the activity, apply ice to prevent pain and swelling. · Do not smoke. Smoking can slow healing. If you need help quitting, talk to your doctor about stop-smoking programs and medicines. These can increase your chances of quitting for good. When should you call for help? Watch closely for changes in your health, and be sure to contact your doctor if:    · Your pain gets worse.     · You do not get better as expected. Where can you learn more? Go to http://philomena-richa.info/. Enter A157 in the search box to learn more about \"Tendon Injury (Tendinopathy): Care Instructions. \"  Current as of: September 20, 2018  Content Version: 11.9  © 8818-2737 [employer]. Care instructions adapted under license by [employer] (which disclaims liability or warranty for this information).  If you have questions about a medical condition or this instruction, always ask your healthcare professional. [place] [address] any warranty or liability for your use of this information.

## 2022-03-19 PROBLEM — E66.01 SEVERE OBESITY (HCC): Status: ACTIVE | Noted: 2019-01-08

## 2024-08-07 ENCOUNTER — HOSPITAL ENCOUNTER (OUTPATIENT)
Facility: HOSPITAL | Age: 59
Discharge: HOME | End: 2024-08-10
Payer: COMMERCIAL

## 2024-08-07 DIAGNOSIS — J18.9 UNRESOLVED PNEUMONIA: ICD-10-CM

## 2024-08-07 PROCEDURE — 71046 X-RAY EXAM CHEST 2 VIEWS: CPT

## 2025-05-02 ENCOUNTER — HOSPITAL ENCOUNTER (OUTPATIENT)
Facility: HOSPITAL | Age: 60
Setting detail: RECURRING SERIES
Discharge: HOME | End: 2025-05-05
Payer: COMMERCIAL

## 2025-05-02 PROCEDURE — 97535 SELF CARE MNGMENT TRAINING: CPT

## 2025-05-02 PROCEDURE — 97161 PT EVAL LOW COMPLEX 20 MIN: CPT

## 2025-05-02 PROCEDURE — 97110 THERAPEUTIC EXERCISES: CPT

## 2025-05-02 NOTE — PROGRESS NOTES
PT DAILY TREATMENT NOTE/KNEE EVAL 3-25      Patient Name: Martín Hudson Jr.    Date: 2025    : 1965  Insurance: Payor: ANIKA GERARD / Plan: [place]ARD [place] / Product Type: *No Product type* /      Patient  verified yes     Visit #   Current / Total 1 18   Time   In / Out 1105 1153   Pain   In / Out 4 4   Subjective Functional Status/Changes:       Treatment Area: Aftercare following surgery of the musculoskeletal system  Status post arthroscopy of right knee    SUBJECTIVE  Pain Level (0-10 scale): 4  [x]constant []intermittent [x]improving []worsening []no change since onset  Worse walking for long distances, ascending more than 3 stairs,       Better sitting, lying down  Subjective functional status/changes:     PLOF: I ADLS and activities with progressive R knee pain, no AD, drove, household and community activities and work demands tolerated with pain   Limitations to PLOF: recovering from surgery  Mechanism of Injury: onset date over time and exacerbated 2024, led to surgery 4/10/25 for meniscus tear   Current symptoms/Complaints: 61 YO male diagnosed as above and with S/S consistent with above diagnosis presents to skilled outpatient PT [facility] R knee tightness and with moderate pain, P/O 4/10/25 for R knee scope. Notes L LE has been taking a majority of the work load and was becoming painful. Notes muscle atrophy in the R quad since having this issue. Also notes history of tight hamstrings. States MD probably added the gait and balance to accommodate for his work demands and needs for return to work.   Previous Treatment/Compliance: MD, MRI, X-rays, surgery  PMHx/Surgical Hx: HTN, cancer- CLL leukemia  Work Hx: full time, [employer] events, OOW due to surgery and anticipated RTW 25  Living Situation: house 3STE with rail  Pt Goals: regain motion and strength in the knee  Barriers: [x]pain []financial []time []transportation []other  Motivation: good  Substance use: []Alcohol []Tobacco []other:   FABQ

## 2025-05-02 NOTE — THERAPY EVALUATION
[facility]  [address], [address], [place] [address]  Ph:[phone] Fx:[phone]  Plan of Care / Statement of Necessity for Physical Therapy Services     Patient Name: Martín Hudson Jr. : 1965   Medical   Diagnosis: Aftercare following surgery of the musculoskeletal system  Status post arthroscopy of right knee Treatment Diagnosis:  M25.561  RIGHT KNEE PAIN and M25.562  LEFT KNEE PAIN  and R26.89  Abnormalities of gait and mobility and R26.81  Unsteadiness on feet   Onset Date: P/O 4/10/25     Referral Source: Katie Ingram* Start of Care (SOC): 2025   Prior Hospitalization: See medical history Provider #: [id number]   Prior Level of Function:  I ADLS and activities with progressive R knee pain, no AD, drove, household and community activities and work demands tolerated with pain    Comorbidities:  Musculoskeletal disordersHTN, cancer- CLL leukemia      Assessment / key information:  59 YO male diagnosed as above and with S/S consistent with above diagnosis presents to skilled outpatient PT [facility] R knee tightness and with moderate pain, P/O 4/10/25 for R knee scope. Notes L LE has been taking a majority of the work load and was becoming painful. Notes muscle atrophy in the R quad since having this issue. Also notes history of tight hamstrings. States MD probably added the gait and balance to accommodate for his work demands and needs for return to work. He has decrease ROM and strength R LE, decrease flexibility B LE, gait abnormality and decrease tolerance to stair negotiation, pain, R knee and reports some L knee pain.   Patient demonstrates the potential to make gains with improved ROM, strength, endurance/activity tolerance, functional LEFS survey score baseline 18 and all within a reasonable time frame so as to increase their functional independence with ADLs and activities for carryover to  improved quality of life, tolerance to

## 2025-05-05 ENCOUNTER — HOSPITAL ENCOUNTER (OUTPATIENT)
Facility: HOSPITAL | Age: 60
Setting detail: RECURRING SERIES
Discharge: HOME | End: 2025-05-08
Payer: COMMERCIAL

## 2025-05-05 PROCEDURE — 97112 NEUROMUSCULAR REEDUCATION: CPT

## 2025-05-05 PROCEDURE — 97110 THERAPEUTIC EXERCISES: CPT

## 2025-05-05 NOTE — PROGRESS NOTES
PHYSICAL / OCCUPATIONAL THERAPY - DAILY TREATMENT NOTE    Patient Name: Martín Hudson Jr.    Date: 2025    : 1965  Insurance: Payor: ANIKA GERARD / [place]: [place] / Product Type: *No Product type* /      Patient  verified Yes     Visit #   Current / Total 2 18   Time   In / Out 1:31 2:21   Pain   In / Out 3-4 3-4   Subjective Functional Status/Changes: Pt reports he over-did activity this weekend and has some pain on the lateral right and IT band insertion region.     TREATMENT AREA =  Aftercare following surgery of the musculoskeletal system  Status post arthroscopy of right knee  Pain in right knee  Pain in left knee  Other abnormalities of gait and mobility  Unsteadiness on feet    OBJECTIVE    Modalities Rationale:     decrease edema, decrease inflammation, and decrease pain to improve patient's ability to progress to PLOF and address remaining functional goals.    10 min  unbill [x]  Ice     []  Heat    location/position: Right knee, supine with legs on wedge   Skin assessment post-treatment:   Intact      Therapeutic Procedures:    Tx Min Billable or 1:1 Min (if diff from Tx Min) Procedure, Rationale, Specifics   15  79587 Therapeutic Exercise (timed):  increase ROM, strength, coordination, balance, and proprioception to improve patient's ability to progress to PLOF and address remaining functional goals. (see flow sheet as applicable)     Details if applicable:       25  73720 Neuromuscular Re-Education (timed):  improve balance, coordination, kinesthetic sense, posture, core stability and proprioception to improve patient's ability to develop conscious control of individual muscles and awareness of position of extremities in order to progress to PLOF and address remaining functional goals. (see flow sheet as applicable)     Details if applicable:     40  [facility] Totals Reminder: bill using total billable min of TIMED therapeutic procedures (example: do not include dry needle or estim

## 2025-05-06 ENCOUNTER — HOSPITAL ENCOUNTER (OUTPATIENT)
Facility: HOSPITAL | Age: 60
Setting detail: RECURRING SERIES
Discharge: HOME | End: 2025-05-09
Payer: COMMERCIAL

## 2025-05-06 PROCEDURE — 97110 THERAPEUTIC EXERCISES: CPT

## 2025-05-06 PROCEDURE — 97112 NEUROMUSCULAR REEDUCATION: CPT

## 2025-05-06 NOTE — PROGRESS NOTES
PHYSICAL / OCCUPATIONAL THERAPY - DAILY TREATMENT NOTE    Patient Name: Martín Hudson Jr.    Date: 2025    : 1965  Insurance: Payor: ANIKA GERARD / [place]: [place] / Product Type: *No Product type* /      Patient  verified Yes     Visit #   Current / Total 3 18   Time   In / Out 1:34 2:13   Pain   In / Out 5 sore>pain 0   Subjective Functional Status/Changes: Pt reports he is sore from yesterday's session but it is a good soreness.     TREATMENT AREA =  Aftercare following surgery of the musculoskeletal system  Status post arthroscopy of right knee  Pain in right knee  Pain in left knee  Other abnormalities of gait and mobility  Unsteadiness on feet    OBJECTIVE  Therapeutic Procedures:    Tx Min Billable or 1:1 Min (if diff from Tx Min) Procedure, Rationale, Specifics   15  53766 Therapeutic Exercise (timed):  increase ROM, strength, coordination, balance, and proprioception to improve patient's ability to progress to PLOF and address remaining functional goals. (see flow sheet as applicable)     Details if applicable:       24  83036 Neuromuscular Re-Education (timed):  improve balance, coordination, kinesthetic sense, posture, core stability and proprioception to improve patient's ability to develop conscious control of individual muscles and awareness of position of extremities in order to progress to PLOF and address remaining functional goals. (see flow sheet as applicable)     Details if applicable:     29  [facility] Totals Reminder: bill using total billable min of TIMED therapeutic procedures (example: do not include dry needle or estim unattended, both untimed codes, in totals to left)  8-22 min = 1 unit; 23-37 min = 2 units; 38-52 min = 3 units; 53-67 min = 4 units; 68-82 min = 5 units   Total Total     Charge Capture    [x]  Patient Education billed concurrently with other procedures   [x] Review HEP    [] Progressed/Changed HEP, detail:    [] Other detail:       Objective

## 2025-05-12 ENCOUNTER — HOSPITAL ENCOUNTER (OUTPATIENT)
Facility: HOSPITAL | Age: 60
Setting detail: RECURRING SERIES
Discharge: HOME | End: 2025-05-15
Payer: COMMERCIAL

## 2025-05-12 PROCEDURE — 97110 THERAPEUTIC EXERCISES: CPT

## 2025-05-12 PROCEDURE — 97112 NEUROMUSCULAR REEDUCATION: CPT

## 2025-05-12 NOTE — PROGRESS NOTES
Information/Functional Measures/Assessment  Reported minimal increase in pain post session today. Added exercises per flow sheet. Good form noted with step ups today. Denial of increased pain with exercises per flow sheet. Minimal right knee EXT lag noted with SLR flex. Continue POC as tolerated to improve pain and activity tolerance.      Patient will continue to benefit from skilled PT / OT services to modify and progress therapeutic interventions, analyze and address functional mobility deficits, analyze and address ROM deficits, analyze and address strength deficits, analyze and address soft tissue restrictions, analyze and cue for proper movement patterns, analyze and modify for postural abnormalities, analyze and address imbalance/dizziness, and instruct in home and community integration to address functional deficits and attain remaining goals.    Progress toward goals / Updated goals:  []  See Progress Note/Recertification  Short Term Goals: To be accomplished in 4 WEEKS  1 patient will have established and be I with HEP to aid with independence and self management at discharge  EVAL HEP issued at evaluation  Current: partial compliance 5/5/2025  2 patient will have pain 3/10 to aid with increase tolerance to ADLS and regular daily activities at home and in the community  EVAL 4  3 patient will have LEFS 27 to show minimal projected gains in function with ADLs and activities  EVAL 18  4 patient will report overall 50% improvement to aid with increase tolerance to walking 2 blocks with no difficulty  EVAL moderate on LEFS     Long Term Goals: To be accomplished in 6 WEEKS  1 patient will have established and be I with HEP to aid with independence and self management at discharge  EVAL HEP issued at evaluation  2 patient will have pain 1/10 to aid with increase tolerance to ADLS and regular daily activities at home and in the community  EVAL 4  Current: 3/10 pre session today 5/12/2025  3 patient will have

## 2025-05-14 ENCOUNTER — HOSPITAL ENCOUNTER (OUTPATIENT)
Facility: HOSPITAL | Age: 60
Setting detail: RECURRING SERIES
Discharge: HOME | End: 2025-05-17
Payer: COMMERCIAL

## 2025-05-14 PROCEDURE — 97112 NEUROMUSCULAR REEDUCATION: CPT

## 2025-05-14 PROCEDURE — 97110 THERAPEUTIC EXERCISES: CPT

## 2025-05-14 NOTE — PROGRESS NOTES
PHYSICAL / OCCUPATIONAL THERAPY - DAILY TREATMENT NOTE    Patient Name: Martín Hudson Jr.    Date: 2025    : 1965  Insurance: Payor: ANIKA GERARD / Plan: [facility] [place] / Product Type: *No Product type* /      Patient  verified Yes     Visit #   Current / Total 5 18   Time   In / Out 12:10 12:55   Pain   In / Out 5 3.5   Subjective Functional Status/Changes: I overdid it yesterday and walked too much. Is feeling some increased knee pain today w/ Pt indicating PFT and general tightness on both inside and outside of the knee      TREATMENT AREA =  Aftercare following surgery of the musculoskeletal system  Status post arthroscopy of right knee  Pain in right knee  Pain in left knee  Other abnormalities of gait and mobility  Unsteadiness on feet    OBJECTIVE  Therapeutic Procedures:    Tx Min Billable or 1:1 Min (if diff from Tx Min) Procedure, Rationale, Specifics   15  11955 Therapeutic Exercise (timed):  increase ROM, strength, coordination, balance, and proprioception to improve patient's ability to progress to PLOF and address remaining functional goals. (see flow sheet as applicable)     Details if applicable:  end range quad activation      30  73870 Neuromuscular Re-Education (timed):  improve balance, coordination, kinesthetic sense, posture, core stability and proprioception to improve patient's ability to develop conscious control of individual muscles and awareness of position of extremities in order to progress to PLOF and address remaining functional goals. (see flow sheet as applicable)     Details if applicable:  quad motor recruitment, balance through terminal knee extension, inferior glides patella   45  [facility] Totals Reminder: bill using total billable min of TIMED therapeutic procedures (example: do not include dry needle or estim unattended, both untimed codes, in totals to left)  8-22 min = 1 unit; 23-37 min = 2 units; 38-52 min = 3 units; 53-67 min = 4 units; 68-82 min = 5 units   Total

## 2025-05-16 ENCOUNTER — TELEPHONE (OUTPATIENT)
Facility: HOSPITAL | Age: 60
End: 2025-05-16

## 2025-05-16 ENCOUNTER — APPOINTMENT (OUTPATIENT)
Facility: HOSPITAL | Age: 60
End: 2025-05-16
Payer: COMMERCIAL

## 2025-05-16 NOTE — TELEPHONE ENCOUNTER
Patient cancelled appt. on 5/16/25 at 7:38am due to the patient not feeling well and he is not able to make his appt. today.

## 2025-05-19 ENCOUNTER — TELEPHONE (OUTPATIENT)
Facility: HOSPITAL | Age: 60
End: 2025-05-19

## 2025-05-19 ENCOUNTER — APPOINTMENT (OUTPATIENT)
Facility: HOSPITAL | Age: 60
End: 2025-05-19
Payer: COMMERCIAL

## 2025-05-19 NOTE — TELEPHONE ENCOUNTER
Patient cancelled appt. on 5/18/25 at 6:50pm due to the patient still being sick and he is not able to make his appt. today.

## 2025-05-21 ENCOUNTER — APPOINTMENT (OUTPATIENT)
Facility: HOSPITAL | Age: 60
End: 2025-05-21
Payer: COMMERCIAL

## 2025-05-23 ENCOUNTER — HOSPITAL ENCOUNTER (OUTPATIENT)
Facility: HOSPITAL | Age: 60
Setting detail: RECURRING SERIES
Discharge: HOME | End: 2025-05-26
Payer: COMMERCIAL

## 2025-05-23 PROCEDURE — 97110 THERAPEUTIC EXERCISES: CPT

## 2025-05-23 PROCEDURE — 97112 NEUROMUSCULAR REEDUCATION: CPT

## 2025-05-23 PROCEDURE — 97530 THERAPEUTIC ACTIVITIES: CPT

## 2025-05-23 NOTE — PROGRESS NOTES
PHYSICAL / OCCUPATIONAL THERAPY - DAILY TREATMENT NOTE    Patient Name: Martín Hudson Jr.    Date: 2025    : 1965  Insurance: Payor: ANIKA GERARD / [place]: [facility] [place] / Product Type: *No Product type* /      Patient  verified Yes     Visit #   Current / Total 6 18   Time   In / Out 12:55 pm 1:35 pm   Pain   In / Out 3 2   Subjective Functional Status/Changes: \"I am trying to walk more normal and I think that is actually helping with my pain.\"     TREATMENT AREA =  Aftercare following surgery of the musculoskeletal system  Status post arthroscopy of right knee  Pain in right knee  Pain in left knee  Other abnormalities of gait and mobility  Unsteadiness on feet    OBJECTIVE    Therapeutic Procedures:      Tx Min Billable or 1:1 Min (if diff from Tx Min) Procedure, Rationale, Specifics   15 15 03953 Therapeutic Exercise (timed):  increase ROM, strength, coordination, balance, and proprioception to improve patient's ability to progress to PLOF and address remaining functional goals. (see flow sheet as applicable)       Details if applicable:  end range quad activation      10 10 10754 Therapeutic Activity (timed):  use of dynamic activities replicating functional movements to increase ROM, strength, coordination, balance, and proprioception in order to improve patient's ability to progress to PLOF and address remaining functional goals.  (see flow sheet as applicable)       Details if applicable:     15 15 26087 Neuromuscular Re-Education (timed):  improve balance, coordination, kinesthetic sense, posture, core stability and proprioception to improve patient's ability to develop conscious control of individual muscles and awareness of position of extremities in order to progress to PLOF and address remaining functional goals. (see flow sheet as applicable)       Details if applicable:  quad motor recruitment,          Details if applicable:           Details if applicable:     40 40  [facility] Totals Reminder:

## 2025-05-27 ENCOUNTER — HOSPITAL ENCOUNTER (OUTPATIENT)
Facility: HOSPITAL | Age: 60
Setting detail: RECURRING SERIES
Discharge: HOME | End: 2025-05-30
Payer: COMMERCIAL

## 2025-05-27 PROCEDURE — 97110 THERAPEUTIC EXERCISES: CPT

## 2025-05-27 PROCEDURE — 97530 THERAPEUTIC ACTIVITIES: CPT

## 2025-05-27 PROCEDURE — 97112 NEUROMUSCULAR REEDUCATION: CPT

## 2025-05-27 NOTE — PROGRESS NOTES
PHYSICAL / OCCUPATIONAL THERAPY - DAILY TREATMENT NOTE    Patient Name: Martín Hudson Jr.    Date: 2025    : 1965  Insurance: Payor: ANIKA GERARD / [place]: [facility] [place] / Product Type: *No Product type* /      Patient  verified Yes     Visit #   Current / Total 7 18   Time   In / Out 334 428   Pain   In / Out 2.5 2   Subjective Functional Status/Changes: It's a little tight.      TREATMENT AREA =  Aftercare following surgery of the musculoskeletal system  Status post arthroscopy of right knee  Pain in right knee  Pain in left knee  Other abnormalities of gait and mobility  Unsteadiness on feet    OBJECTIVE    PD CP and will ice at home PRN     Therapeutic Procedures:    Tx Min Billable or 1:1 Min (if diff from Tx Min) Procedure, Rationale, Specifics   14 14 65094 Therapeutic Exercise (timed):  increase ROM, strength, coordination, balance, and proprioception to improve patient's ability to progress to PLOF and address remaining functional goals. (see flow sheet as applicable)     Details if applicable:       25  27397 Therapeutic Activity (timed):  use of dynamic activities replicating functional movements to increase ROM, strength, coordination, balance, and proprioception in order to improve patient's ability to progress to PLOF and address remaining functional goals.  (see flow sheet as applicable)     Details if applicable:     15 15 84121 Neuromuscular Re-Education (timed):  improve balance, coordination, kinesthetic sense, posture, core stability and proprioception to improve patient's ability to develop conscious control of individual muscles and awareness of position of extremities in order to progress to PLOF and address remaining functional goals. (see flow sheet as applicable)     Details if applicable:            Details if applicable:            Details if applicable:     54 54 [facility] Totals Reminder: bill using total billable min of TIMED therapeutic procedures (example: do not include dry

## 2025-05-28 ENCOUNTER — HOSPITAL ENCOUNTER (OUTPATIENT)
Facility: HOSPITAL | Age: 60
Setting detail: RECURRING SERIES
Discharge: HOME | End: 2025-05-31
Payer: COMMERCIAL

## 2025-05-28 PROCEDURE — 97530 THERAPEUTIC ACTIVITIES: CPT

## 2025-05-28 PROCEDURE — 97110 THERAPEUTIC EXERCISES: CPT

## 2025-05-28 PROCEDURE — 97112 NEUROMUSCULAR REEDUCATION: CPT

## 2025-05-28 NOTE — PROGRESS NOTES
PHYSICAL / OCCUPATIONAL THERAPY - DAILY TREATMENT NOTE    Patient Name: Martín Hudson Jr.    Date: 2025    : 1965  Insurance: Payor: ANIKA GERARD / [place]: [facility] [place] / Product Type: *No Product type* /      Patient  verified Yes     Visit #   Current / Total 8 18   Time   In / Out 935 1020   Pain   In / Out 3 2 1/2   Subjective Functional Status/Changes: I was a little sore when I first got up this morning and started walking around, but it felt better after I walked more on it.      TREATMENT AREA =  Aftercare following surgery of the musculoskeletal system  Status post arthroscopy of right knee  Pain in right knee  Pain in left knee  Other abnormalities of gait and mobility  Unsteadiness on feet    OBJECTIVE       Therapeutic Procedures:    Tx Min Billable or 1:1 Min (if diff from Tx Min) Procedure, Rationale, Specifics   21  85239 Therapeutic Exercise (timed):  increase ROM, strength, coordination, balance, and proprioception to improve patient's ability to progress to PLOF and address remaining functional goals. (see flow sheet as applicable)     Details if applicable:        86945 Therapeutic Activity (timed):  use of dynamic activities replicating functional movements to increase ROM, strength, coordination, balance, and proprioception in order to improve patient's ability to progress to PLOF and address remaining functional goals.  (see flow sheet as applicable)     Details if applicable:      82999 Neuromuscular Re-Education (timed):  improve balance, coordination, kinesthetic sense, posture, core stability and proprioception to improve patient's ability to develop conscious control of individual muscles and awareness of position of extremities in order to progress to PLOF and address remaining functional goals. (see flow sheet as applicable)     Details if applicable:            Details if applicable:            Details if applicable:     45 45 [facility] Totals Reminder: bill using

## 2025-05-29 ENCOUNTER — APPOINTMENT (OUTPATIENT)
Facility: HOSPITAL | Age: 60
End: 2025-05-29
Payer: COMMERCIAL

## 2025-05-30 ENCOUNTER — HOSPITAL ENCOUNTER (OUTPATIENT)
Facility: HOSPITAL | Age: 60
Setting detail: RECURRING SERIES
End: 2025-05-30
Payer: COMMERCIAL

## 2025-05-30 PROCEDURE — 97530 THERAPEUTIC ACTIVITIES: CPT

## 2025-05-30 PROCEDURE — 97112 NEUROMUSCULAR REEDUCATION: CPT

## 2025-05-30 PROCEDURE — 97110 THERAPEUTIC EXERCISES: CPT

## 2025-05-30 NOTE — PROGRESS NOTES
PHYSICAL / OCCUPATIONAL THERAPY - DAILY TREATMENT NOTE    Patient Name: Martín Hudson Jr.    Date: 2025    : 1965  Insurance: Payor: ANIKA GERARD / [place]: [facility] [place] / Product Type: *No Product type* /      Patient  verified Yes     Visit #   Current / Total 9 18   Time   In / Out 4:20 4:58   Pain   In / Out 2.5 2   Subjective Functional Status/Changes: \"Im sore today.\"     TREATMENT AREA =  Aftercare following surgery of the musculoskeletal system  Status post arthroscopy of right knee  Pain in right knee  Pain in left knee  Other abnormalities of gait and mobility  Unsteadiness on feet    OBJECTIVE         Therapeutic Procedures:    Tx Min Billable or 1:1 Min (if diff from Tx Min) Procedure, Rationale, Specifics   15  23090 Therapeutic Exercise (timed):  increase ROM, strength, coordination, balance, and proprioception to improve patient's ability to progress to PLOF and address remaining functional goals. (see flow sheet as applicable)     Details if applicable:       15  95920 Therapeutic Activity (timed):  use of dynamic activities replicating functional movements to increase ROM, strength, coordination, balance, and proprioception in order to improve patient's ability to progress to PLOF and address remaining functional goals.  (see flow sheet as applicable)     Details if applicable:     8  38568 Neuromuscular Re-Education (timed):  improve balance, coordination, kinesthetic sense, posture, core stability and proprioception to improve patient's ability to develop conscious control of individual muscles and awareness of position of extremities in order to progress to PLOF and address remaining functional goals. (see flow sheet as applicable)     Details if applicable:            Details if applicable:            Details if applicable:     38  [facility] Totals Reminder: bill using total billable min of TIMED therapeutic procedures (example: do not include dry needle or estim unattended, both untimed  codes, in totals to left)  8-22 min = 1 unit; 23-37 min = 2 units; 38-52 min = 3 units; 53-67 min = 4 units; 68-82 min = 5 units   Total Total     Charge Capture    [x]  Patient Education billed concurrently with other procedures   [x] Review HEP    [] Progressed/Changed HEP, detail:    [] Other detail:       Objective Information/Functional Measures/Assessment    Pt responded fairly well to each strengthening there ex for (B) knees with no increased pain .  Pt Is ambulating with good step length. Following treatment pt reports decreased pain and increase in mobility at (B) knees.    Patient will continue to benefit from skilled PT / OT services to modify and progress therapeutic interventions, analyze and address functional mobility deficits, analyze and address ROM deficits, analyze and address strength deficits, analyze and address soft tissue restrictions, analyze and cue for proper movement patterns, analyze and modify for postural abnormalities, analyze and address imbalance/dizziness, and instruct in home and community integration to address functional deficits and attain remaining goals.    Progress toward goals / Updated goals:  []  See Progress Note/Recertification  1 patient will have established and be I with HEP to aid with independence and self management at discharge  EVAL HEP issued at evaluation  Current: partial compliance 5/28/2025  2 patient will have pain 3/10 to aid with increase tolerance to ADLS and regular daily activities at home and in the community  EVAL 4  Current: patient arrived to PT with 3/10   5/28/25  3 patient will have LEFS 27 to show minimal projected gains in function with ADLs and activities  EVAL 18  Current: ongoing, will assess next PN   5/28/25  4 patient will report overall 50% improvement to aid with increase tolerance to walking 2 blocks with no difficulty  EVAL moderate on LEFS  Current: ongoing, will assess next PN   5/28/25     Long Term Goals: To be accomplished in

## 2025-06-02 ENCOUNTER — HOSPITAL ENCOUNTER (OUTPATIENT)
Facility: HOSPITAL | Age: 60
Setting detail: RECURRING SERIES
Discharge: HOME | End: 2025-06-05
Payer: COMMERCIAL

## 2025-06-02 PROCEDURE — 97112 NEUROMUSCULAR REEDUCATION: CPT

## 2025-06-02 PROCEDURE — 97530 THERAPEUTIC ACTIVITIES: CPT

## 2025-06-02 PROCEDURE — 97110 THERAPEUTIC EXERCISES: CPT

## 2025-06-02 NOTE — PROGRESS NOTES
PHYSICAL / OCCUPATIONAL THERAPY - DAILY TREATMENT NOTE    Patient Name: Martín Hudson Jr.    Date: 2025    : 1965  Insurance: Payor: ANIKA GERARD / [place]: [facility] [place] / Product Type: *No Product type* /      Patient  verified Yes     Visit #   Current / Total 10 18   Time   In / Out 9:35 10:13   Pain   In / Out 2 0   Subjective Functional Status/Changes: Feels 75% better.      TREATMENT AREA =  Aftercare following surgery of the musculoskeletal system  Status post arthroscopy of right knee  Pain in right knee  Pain in left knee  Other abnormalities of gait and mobility  Unsteadiness on feet    OBJECTIVE     Strength (1-5)      Left Right Left Right Left Right   Hip Flexion WFL WFL     5 5     Extension                 Abduction WFL WFL     5 4+     Adduction WFL WFL     5 4+   Knee Flexion 110 sitting 120  sitting     5 4+     Extension -5 sitting -2 supine      5 5   Ankle Plantarflexion                 Dorsiflexion                   Therapeutic Procedures:    Tx Min Billable or 1:1 Min (if diff from Tx Min) Procedure, Rationale, Specifics   12  15081 Therapeutic Exercise (timed):  increase ROM, strength, coordination, balance, and proprioception to improve patient's ability to progress to PLOF and address remaining functional goals. (see flow sheet as applicable)     Details if applicable:       12  60122 Therapeutic Activity (timed):  use of dynamic activities replicating functional movements to increase ROM, strength, coordination, balance, and proprioception in order to improve patient's ability to progress to PLOF and address remaining functional goals.  (see flow sheet as applicable)     Details if applicable:     14  60061 Neuromuscular Re-Education (timed):  improve balance, coordination, kinesthetic sense, posture, core stability and proprioception to improve patient's ability to develop conscious control of individual muscles and awareness of position of extremities in order to progress to 
Flexion WFL WFL     5 5     Extension                 Abduction WFL WFL     5 4+     Adduction WFL WFL     5 4+   Knee Flexion 110 sitting 120  sitting     5 4+     Extension -5 sitting -2 supine      5 5   Ankle Plantarflexion                 Dorsiflexion                   Progress toward goals / Updated goals:  []  See Progress Note/Recertification  1 patient will have established and be I with HEP to aid with independence and self management at discharge  EVAL HEP issued at evaluation  PN: more compliant with functional based tasks than supine strength tasks 6/2/25 progressing  2 patient will have pain 3/10 to aid with increase tolerance to ADLS and regular daily activities at home and in the community  EVAL 4  PN patient arrived to PT with 2/10   6/2/25 MET  3 patient will have LEFS 27 to show minimal projected gains in function with ADLs and activities  EVAL 18  PN: 49/80 6/2/25 MET  4 patient will report overall 50% improvement to aid with increase tolerance to walking 2 blocks with no difficulty  EVAL moderate on LEFS  PN: Score of little bit difficulty on LEFS MET 6/2/25     Long Term Goals: To be accomplished in 6 WEEKS  1 patient will have established and be I with HEP to aid with independence and self management at discharge  EVAL HEP issued at evaluation  PN: partial compliance 6/2/25  2 patient will have pain 1/10 to aid with increase tolerance to ADLS and regular daily activities at home and in the community  EVAL 4  PN: 2 /10 pre session today 6/2/2025  3 patient will have LEFS 40 to show minimal projected gains in function with ADLs and activities  EVAL 18  PN: ongoing, 49/80 but not consistent  4 patient will demonstrate little to no difficulty with 8\" stairs reciprocally and 1 rail for carryover to home and work demands  EVAL Step to pattern and uses rails  PN: Consistent w/ step over step no UE and able to carry up to 15# up and down steps  5 patient will report overall 70% improvement to aid with

## 2025-06-04 ENCOUNTER — HOSPITAL ENCOUNTER (OUTPATIENT)
Facility: HOSPITAL | Age: 60
Setting detail: RECURRING SERIES
Discharge: HOME | End: 2025-06-07
Payer: COMMERCIAL

## 2025-06-04 ENCOUNTER — TELEPHONE (OUTPATIENT)
Facility: HOSPITAL | Age: 60
End: 2025-06-04

## 2025-06-04 PROCEDURE — 97530 THERAPEUTIC ACTIVITIES: CPT

## 2025-06-04 PROCEDURE — 97112 NEUROMUSCULAR REEDUCATION: CPT

## 2025-06-04 NOTE — TELEPHONE ENCOUNTER
Patient cancelled appt. on 6/4/25 at 9:41am due to the patient being out of town and he is not able to make his appt.

## 2025-06-04 NOTE — PROGRESS NOTES
PHYSICAL / OCCUPATIONAL THERAPY - DAILY TREATMENT NOTE    Patient Name: Martín Hudson Jr.    Date: 2025    : 1965  Insurance: Payor: ANIKA GERARD / [place]: [facility] [place] / Product Type: *No Product type* /      Patient  verified Yes     Visit #   Current / Total 1 8   Time   In / Out 9:40 10:10   Pain   In / Out 2 0   Subjective Functional Status/Changes: Feels 75% better.      TREATMENT AREA =  Aftercare following surgery of the musculoskeletal system  Status post arthroscopy of right knee  Pain in right knee  Pain in left knee  Other abnormalities of gait and mobility  Unsteadiness on feet    OBJECTIVE          Therapeutic Procedures:    Tx Min Billable or 1:1 Min (if diff from Tx Min) Procedure, Rationale, Specifics   x  86091 Therapeutic Exercise (timed):  increase ROM, strength, coordination, balance, and proprioception to improve patient's ability to progress to PLOF and address remaining functional goals. (see flow sheet as applicable)     Details if applicable:       15  28762 Therapeutic Activity (timed):  use of dynamic activities replicating functional movements to increase ROM, strength, coordination, balance, and proprioception in order to improve patient's ability to progress to PLOF and address remaining functional goals.  (see flow sheet as applicable)     Details if applicable:     15  07563 Neuromuscular Re-Education (timed):  improve balance, coordination, kinesthetic sense, posture, core stability and proprioception to improve patient's ability to develop conscious control of individual muscles and awareness of position of extremities in order to progress to PLOF and address remaining functional goals. (see flow sheet as applicable)     Details if applicable:            Details if applicable:            Details if applicable:     30  [facility] Totals Reminder: bill using total billable min of TIMED therapeutic procedures (example: do not include dry needle or estim unattended, both untimed

## 2025-06-06 ENCOUNTER — HOSPITAL ENCOUNTER (OUTPATIENT)
Facility: HOSPITAL | Age: 60
Setting detail: RECURRING SERIES
Discharge: HOME | End: 2025-06-09
Payer: COMMERCIAL

## 2025-06-06 PROCEDURE — 97530 THERAPEUTIC ACTIVITIES: CPT

## 2025-06-06 PROCEDURE — 97112 NEUROMUSCULAR REEDUCATION: CPT

## 2025-06-06 PROCEDURE — 97110 THERAPEUTIC EXERCISES: CPT

## 2025-06-06 NOTE — PROGRESS NOTES
PHYSICAL / OCCUPATIONAL THERAPY - DAILY TREATMENT NOTE    Patient Name: Martín Hudson Jr.    Date: 2025    : 1965  Insurance: Payor: ANIKA GERARD / [place]: [facility] [place] / Product Type: *No Product type* /      Patient  verified Yes     Visit #   Current / Total 2 8   Time   In / Out 935 1013   Pain   In / Out 3 <3   Subjective Functional Status/Changes: I am about a 3 today.      TREATMENT AREA =  Aftercare following surgery of the musculoskeletal system  Status post arthroscopy of right knee  Pain in right knee  Pain in left knee  Other abnormalities of gait and mobility  Unsteadiness on feet    OBJECTIVE    PD will ice at home     Therapeutic Procedures:    Tx Min Billable or 1:1 Min (if diff from Tx Min) Procedure, Rationale, Specifics   15 15 40597 Therapeutic Exercise (timed):  increase ROM, strength, coordination, balance, and proprioception to improve patient's ability to progress to PLOF and address remaining functional goals. (see flow sheet as applicable)     Details if applicable:        89494 Therapeutic Activity (timed):  use of dynamic activities replicating functional movements to increase ROM, strength, coordination, balance, and proprioception in order to improve patient's ability to progress to PLOF and address remaining functional goals.  (see flow sheet as applicable)     Details if applicable:     15 15 03463 Neuromuscular Re-Education (timed):  improve balance, coordination, kinesthetic sense, posture, core stability and proprioception to improve patient's ability to develop conscious control of individual muscles and awareness of position of extremities in order to progress to PLOF and address remaining functional goals. (see flow sheet as applicable)     Details if applicable:            Details if applicable:            Details if applicable:     38 38 [facility] Totals Reminder: bill using total billable min of TIMED therapeutic procedures (example: do not include dry needle or

## 2025-06-06 NOTE — PROGRESS NOTES
[facility]  [address], [address], [place] [address]  Ph:[phone] Fx:[phone]  PHYSICAL THERAPY PROGRESS NOTE  Patient Name: Martín Hudson Jr. : 1965   Medical/Treatment Diagnosis: Aftercare following surgery of the musculoskeletal system  Status post arthroscopy of right knee  Pain in right knee  Pain in left knee  Other abnormalities of gait and mobility  Unsteadiness on feet   Referral Source: Katie Ingram*     Date of Initial Visit: 25 Attended Visits: 10 Missed Visits: 3     SUMMARY OF TREATMENT  Pt is 59 y/o male s/p R TKA  who has been seen for 10 PT visits since  w/ focus of care on improving flexibility and strength of the lower quadrant to reduce pain and improve functional performance in home and community. Pt has been making steady progress demonstrating progress in the following areas, strength, AROM, gait w/ reduced deviation, functional squat and lift, and improved LEFS. Pt has been performing functional HEP w/ focus on sit to stand, squat to retrieve and yard work. Pt's goal is to return to work by July and needs to be able to lift and carry 50# and be able to get on/off floor. Trial of walk and carry w/ 30# today and able to rise from split squat w/ both R and L knee on air ex but fatigued quickly w/ R knee in down position. Pt is progressing well and anticipate that he will DC from care in the next 30 days. Focus in next 30 days will be directed to distance walking w/ variations in speed and lift and carry tasks. Continued care on lower quadrant strengthening and eccentric control . Based on current level of progression, anticipate that Pt will meet all goals w/in this this next month. Remaining barriers are: fear of pain, consistency of performance and translation of available strength and range into functional based tasks     CURRENT STATUS     Strength (1-5)      Left Right Left Right Left Right   Hip

## 2025-06-09 ENCOUNTER — APPOINTMENT (OUTPATIENT)
Facility: HOSPITAL | Age: 60
End: 2025-06-09
Payer: COMMERCIAL

## 2025-06-11 ENCOUNTER — APPOINTMENT (OUTPATIENT)
Facility: HOSPITAL | Age: 60
End: 2025-06-11
Payer: COMMERCIAL

## 2025-06-13 ENCOUNTER — HOSPITAL ENCOUNTER (OUTPATIENT)
Facility: HOSPITAL | Age: 60
Setting detail: RECURRING SERIES
Discharge: HOME | End: 2025-06-16
Payer: COMMERCIAL

## 2025-06-13 PROCEDURE — 97530 THERAPEUTIC ACTIVITIES: CPT

## 2025-06-13 PROCEDURE — 97110 THERAPEUTIC EXERCISES: CPT

## 2025-06-13 PROCEDURE — 97112 NEUROMUSCULAR REEDUCATION: CPT

## 2025-06-13 NOTE — PROGRESS NOTES
PHYSICAL / OCCUPATIONAL THERAPY - DAILY TREATMENT NOTE    Patient Name: Martín Hudson Jr.    Date: 2025    : 1965  Insurance: Payor: ANIKA GERARD / Plan: [facility] [place] / Product Type: *No Product type* /      Patient  verified Yes     Visit #   Current / Total 3 8   Time   In / Out 9:30 10:19   Pain   In / Out 2 0   Subjective Functional Status/Changes: \"I have bee going up a lot of stairs this week.\"     TREATMENT AREA =  Aftercare following surgery of the musculoskeletal system  Status post arthroscopy of right knee  Pain in right knee  Pain in left knee  Other abnormalities of gait and mobility  Unsteadiness on feet    OBJECTIVE         Therapeutic Procedures:    Tx Min Billable or 1:1 Min (if diff from Tx Min) Procedure, Rationale, Specifics   24  [id number] Therapeutic Exercise (timed):  increase ROM, strength, coordination, balance, and proprioception to improve patient's ability to progress to PLOF and address remaining functional goals. (see flow sheet as applicable)     Details if applicable:       10  89100 Therapeutic Activity (timed):  use of dynamic activities replicating functional movements to increase ROM, strength, coordination, balance, and proprioception in order to improve patient's ability to progress to PLOF and address remaining functional goals.  (see flow sheet as applicable)     Details if applicable:       Details if applicable:     15  37796 Neuromuscular Re-Education (timed):  improve balance, coordination, kinesthetic sense, posture, core stability and proprioception to improve patient's ability to develop conscious control of individual muscles and awareness of position of extremities in order to progress to PLOF and address remaining functional goals. (see flow sheet as applicable)     Details if applicable:            Details if applicable:     49  [facility] Totals Reminder: bill using total billable min of TIMED therapeutic procedures (example: do not include dry needle or estim

## 2025-06-16 ENCOUNTER — HOSPITAL ENCOUNTER (OUTPATIENT)
Facility: HOSPITAL | Age: 60
Setting detail: RECURRING SERIES
End: 2025-06-16
Payer: COMMERCIAL

## 2025-06-16 ENCOUNTER — TELEPHONE (OUTPATIENT)
Facility: HOSPITAL | Age: 60
End: 2025-06-16

## 2025-06-16 NOTE — TELEPHONE ENCOUNTER
Patient cancelled appt. on 6/16/25 at 9:07am due to the patient having stomach issues, he is not feeling well and he is not able to make his appt. today.

## 2025-06-18 ENCOUNTER — HOSPITAL ENCOUNTER (OUTPATIENT)
Facility: HOSPITAL | Age: 60
Setting detail: RECURRING SERIES
Discharge: HOME | End: 2025-06-21
Payer: COMMERCIAL

## 2025-06-18 PROCEDURE — 97112 NEUROMUSCULAR REEDUCATION: CPT

## 2025-06-18 PROCEDURE — 97110 THERAPEUTIC EXERCISES: CPT

## 2025-06-18 PROCEDURE — 97530 THERAPEUTIC ACTIVITIES: CPT

## 2025-06-18 NOTE — PROGRESS NOTES
PHYSICAL / OCCUPATIONAL THERAPY - DAILY TREATMENT NOTE    Patient Name: Martín Hudson Jr.    Date: 2025    : 1965  Insurance: Payor: ANIKA GERARD / Plan: [facility] [place] / Product Type: *No Product type* /      Patient  verified Yes     Visit #   Current / Total 4 8   Time   In / Out 934 1012   Pain   In / Out 2 0   Subjective Functional Status/Changes: I am set to go back to work 25 and see the DrShanda 25     TREATMENT AREA =  Aftercare following surgery of the musculoskeletal system  Status post arthroscopy of right knee  Pain in right knee  Pain in left knee  Other abnormalities of gait and mobility  Unsteadiness on feet    OBJECTIVE         Therapeutic Procedures:    Tx Min Billable or 1:1 Min (if diff from Tx Min) Procedure, Rationale, Specifics   15 15 [id number] Therapeutic Exercise (timed):  increase ROM, strength, coordination, balance, and proprioception to improve patient's ability to progress to PLOF and address remaining functional goals. (see flow sheet as applicable)     Details if applicable:       8  46850 Neuromuscular Re-Education (timed):  improve balance, coordination, kinesthetic sense, posture, core stability and proprioception to improve patient's ability to develop conscious control of individual muscles and awareness of position of extremities in order to progress to PLOF and address remaining functional goals. (see flow sheet as applicable)     Details if applicable:     15 15 44047 Therapeutic Activity (timed):  use of dynamic activities replicating functional movements to increase ROM, strength, coordination, balance, and proprioception in order to improve patient's ability to progress to PLOF and address remaining functional goals.  (see flow sheet as applicable)     Details if applicable:            Details if applicable:            Details if applicable:     38 38 [facility] Totals Reminder: bill using total billable min of TIMED therapeutic procedures (example: do not include

## 2025-06-19 ENCOUNTER — HOSPITAL ENCOUNTER (OUTPATIENT)
Facility: HOSPITAL | Age: 60
Setting detail: RECURRING SERIES
Discharge: HOME | End: 2025-06-22
Payer: COMMERCIAL

## 2025-06-19 PROCEDURE — 97530 THERAPEUTIC ACTIVITIES: CPT

## 2025-06-19 PROCEDURE — 97112 NEUROMUSCULAR REEDUCATION: CPT

## 2025-06-19 PROCEDURE — 97110 THERAPEUTIC EXERCISES: CPT

## 2025-06-19 NOTE — PROGRESS NOTES
PHYSICAL / OCCUPATIONAL THERAPY - DAILY TREATMENT NOTE    Patient Name: Martín Hudson Jr.    Date: 2025    : 1965  Insurance: Payor: ANIKA GERARD / Plan: [facility] [place] / Product Type: *No Product type* /      Patient  verified Yes     Visit #   Current / Total 5 8   Time   In / Out 10:17 10:57   Pain   In / Out 0-1 0   Subjective Functional Status/Changes: \"Little pain.\"     TREATMENT AREA =  Aftercare following surgery of the musculoskeletal system  Status post arthroscopy of right knee  Pain in right knee  Pain in left knee  Other abnormalities of gait and mobility  Unsteadiness on feet    OBJECTIVE         Therapeutic Procedures:    Tx Min Billable or 1:1 Min (if diff from Tx Min) Procedure, Rationale, Specifics   17  76329 Therapeutic Exercise (timed):  increase ROM, strength, coordination, balance, and proprioception to improve patient's ability to progress to PLOF and address remaining functional goals. (see flow sheet as applicable)     Details if applicable:       15  96793 Therapeutic Activity (timed):  use of dynamic activities replicating functional movements to increase ROM, strength, coordination, balance, and proprioception in order to improve patient's ability to progress to PLOF and address remaining functional goals.  (see flow sheet as applicable)     Details if applicable:  added  stair climbing with 15#   8  89388 Neuromuscular Re-Education (timed):  improve balance, coordination, kinesthetic sense, posture, core stability and proprioception to improve patient's ability to develop conscious control of individual muscles and awareness of position of extremities in order to progress to PLOF and address remaining functional goals. (see flow sheet as applicable)     Details if applicable:            Details if applicable:            Details if applicable:     40  [facility] Totals Reminder: bill using total billable min of TIMED therapeutic procedures (example: do not include dry needle or

## 2025-06-20 ENCOUNTER — HOSPITAL ENCOUNTER (OUTPATIENT)
Facility: HOSPITAL | Age: 60
Setting detail: RECURRING SERIES
Discharge: HOME | End: 2025-06-23
Payer: COMMERCIAL

## 2025-06-20 PROCEDURE — 97110 THERAPEUTIC EXERCISES: CPT

## 2025-06-20 PROCEDURE — 97112 NEUROMUSCULAR REEDUCATION: CPT

## 2025-06-20 PROCEDURE — 97530 THERAPEUTIC ACTIVITIES: CPT

## 2025-06-20 NOTE — PROGRESS NOTES
PHYSICAL / OCCUPATIONAL THERAPY - DAILY TREATMENT NOTE    Patient Name: Martín Hudson Jr.    Date: 2025    : 1965  Insurance: Payor: ANIKA GERARD / Plan: [facility] [place] / Product Type: *No Product type* /      Patient  verified Yes     Visit #   Current / Total 6 8   Time   In / Out 935 1014   Pain   In / Out 1 0   Subjective Functional Status/Changes: Let's give it about a 1. I don't go back to the doctor until after I go back to work.      TREATMENT AREA =  Aftercare following surgery of the musculoskeletal system  Status post arthroscopy of right knee  Pain in right knee  Pain in left knee  Other abnormalities of gait and mobility  Unsteadiness on feet    OBJECTIVE    PD post      Therapeutic Procedures:    Tx Min Billable or 1:1 Min (if diff from Tx Min) Procedure, Rationale, Specifics   14 14 [id number] Therapeutic Exercise (timed):  increase ROM, strength, coordination, balance, and proprioception to improve patient's ability to progress to PLOF and address remaining functional goals. (see flow sheet as applicable)     Details if applicable:       15 15 97219 Therapeutic Activity (timed):  use of dynamic activities replicating functional movements to increase ROM, strength, coordination, balance, and proprioception in order to improve patient's ability to progress to PLOF and address remaining functional goals.  (see flow sheet as applicable)     Details if applicable:     10 10 84419 Neuromuscular Re-Education (timed):  improve balance, coordination, kinesthetic sense, posture, core stability and proprioception to improve patient's ability to develop conscious control of individual muscles and awareness of position of extremities in order to progress to PLOF and address remaining functional goals. (see flow sheet as applicable)     Details if applicable:            Details if applicable:            Details if applicable:     39 39 [facility] Totals Reminder: bill using total billable min of TIMED therapeutic

## 2025-06-26 ENCOUNTER — HOSPITAL ENCOUNTER (OUTPATIENT)
Facility: HOSPITAL | Age: 60
Setting detail: RECURRING SERIES
End: 2025-06-26
Payer: COMMERCIAL